# Patient Record
Sex: FEMALE | Race: WHITE | NOT HISPANIC OR LATINO | URBAN - METROPOLITAN AREA
[De-identification: names, ages, dates, MRNs, and addresses within clinical notes are randomized per-mention and may not be internally consistent; named-entity substitution may affect disease eponyms.]

---

## 2017-01-30 ENCOUNTER — APPOINTMENT (RX ONLY)
Dept: URBAN - METROPOLITAN AREA OTHER 9 | Facility: OTHER | Age: 52
Setting detail: DERMATOLOGY
End: 2017-01-30

## 2017-01-30 ENCOUNTER — APPOINTMENT (RX ONLY)
Dept: URBAN - METROPOLITAN AREA SURGERY 2 | Facility: SURGERY | Age: 52
Setting detail: DERMATOLOGY
End: 2017-01-30

## 2017-01-30 DIAGNOSIS — Z41.9 ENCOUNTER FOR PROCEDURE FOR PURPOSES OTHER THAN REMEDYING HEALTH STATE, UNSPECIFIED: ICD-10-CM

## 2017-01-30 DIAGNOSIS — L70.0 ACNE VULGARIS: ICD-10-CM

## 2017-01-30 PROCEDURE — ? BOTOX

## 2017-01-30 PROCEDURE — ? TREATMENT REGIMEN

## 2017-01-30 PROCEDURE — ? COUNSELING

## 2017-01-30 PROCEDURE — 99212 OFFICE O/P EST SF 10 MIN: CPT

## 2017-01-30 ASSESSMENT — LOCATION ZONE DERM: LOCATION ZONE: FACE

## 2017-01-30 ASSESSMENT — LOCATION SIMPLE DESCRIPTION DERM: LOCATION SIMPLE: RIGHT CHEEK

## 2017-01-30 ASSESSMENT — LOCATION DETAILED DESCRIPTION DERM: LOCATION DETAILED: RIGHT CENTRAL MALAR CHEEK

## 2017-01-30 NOTE — PROCEDURE: BOTOX
Manuel Units: 0
Administered By (Optional): Susan Palacios RN
Map Statment: See attached map for complete details
Consent: Written consent was obtained prior to the procedure. Risks, benefits, expectations and alternatives were discussed including, but not limited to, infection, bleeding, lid/brow ptosis, bruising, swelling, diplopia, temporary effects, incomplete chemical denervation and dissatisfaction with the cosmetic outcome. No guarantee or warranty was given or implied regarding longevity of results.
Show Price In Note?: yes
Additional Area 3 Location: Eyebrow
Detail Level: Simple
Periorbital Skin Units: 25
Dilution (U/0.1 Cc): 5
Postcare Instructions: Patient instructed to not lie down for 4 hours and limit physical activity for 24 hours. Patient instructed not to travel by airplane for 48 hours.\\n\\n*****
Price Per Unit In $ (Use Numbers Only, No Text Please.): 17
Glabellar Complex Units: 10
Lot #: [id number]
Additional Area 2 Location: Chin
Expiration Date (Month Year): 08/19
Use Map Statement For Sites (Optional): No
Additional Area 1 Location: Eyebrows rhytids
Bill Summary Price Listed Below, Or Bill Total Of Units X Price Per Unit?: Bill #Units x Price Per Unit

## 2017-01-30 NOTE — HPI: PIMPLES (ACNE)
How Severe Is Your Acne?: moderate
Is This A New Presentation, Or A Follow-Up?: Acne
Females Only: When Was Your Last Menstrual Period?: 01/15/2017

## 2017-02-13 ENCOUNTER — APPOINTMENT (RX ONLY)
Dept: URBAN - METROPOLITAN AREA SURGERY 2 | Facility: SURGERY | Age: 52
Setting detail: DERMATOLOGY
End: 2017-02-13

## 2017-02-13 DIAGNOSIS — Z42.8 ENCOUNTER FOR OTHER PLASTIC AND RECONSTRUCTIVE SURGERY FOLLOWING MEDICAL PROCEDURE OR HEALED INJURY: ICD-10-CM

## 2017-02-13 PROCEDURE — ? BOTOX

## 2017-02-13 NOTE — PROCEDURE: BOTOX
Consent: Written consent was obtained prior to the procedure. Risks, benefits, expectations and alternatives were discussed including, but not limited to, infection, bleeding, lid/brow ptosis, bruising, swelling, diplopia, temporary effects, incomplete chemical denervation and dissatisfaction with the cosmetic outcome. No guarantee or warranty was given or implied regarding longevity of results.
Detail Level: Simple
Additional Area 3 Location: Eyebrow
Expiration Date (Month Year): 09/19
Periorbital Skin Units: 15
Use Map Statement For Sites (Optional): No
Perioral Units: 0
Postcare Instructions: Patient instructed to not lie down for 4 hours and limit physical activity for 24 hours. Patient instructed not to travel by airplane for 48 hours.\\n\\n*****
Price Per Unit In $ (Use Numbers Only, No Text Please.): 17
Bill Summary Price Listed Below, Or Bill Total Of Units X Price Per Unit?: Bill #Units x Price Per Unit
Additional Area 1 Location: Eyebrows rhytids
Map Statment: See attached map for complete details
Lot #: [id number]
Administered By (Optional): Susan Palacios RN
Additional Area 2 Location: Chin
Dilution (U/0.1 Cc): 5
Show Price In Note?: yes

## 2017-03-08 ENCOUNTER — APPOINTMENT (RX ONLY)
Dept: URBAN - METROPOLITAN AREA SURGERY 2 | Facility: SURGERY | Age: 52
Setting detail: DERMATOLOGY
End: 2017-03-08

## 2017-03-08 DIAGNOSIS — Z42.8 ENCOUNTER FOR OTHER PLASTIC AND RECONSTRUCTIVE SURGERY FOLLOWING MEDICAL PROCEDURE OR HEALED INJURY: ICD-10-CM

## 2017-03-08 DIAGNOSIS — Z41.9 ENCOUNTER FOR PROCEDURE FOR PURPOSES OTHER THAN REMEDYING HEALTH STATE, UNSPECIFIED: ICD-10-CM

## 2017-03-08 PROCEDURE — ? BOTOX

## 2017-03-08 PROCEDURE — ? FILLERS

## 2017-03-08 NOTE — PROCEDURE: BOTOX
Consent: Written consent was obtained prior to the procedure. Risks, benefits, expectations and alternatives were discussed including, but not limited to, infection, bleeding, lid/brow ptosis, bruising, swelling, diplopia, temporary effects, incomplete chemical denervation and dissatisfaction with the cosmetic outcome. No guarantee or warranty was given or implied regarding longevity of results.
Dilution (U/0.1 Cc): 5
Postcare Instructions: Patient instructed to not lie down for 4 hours and limit physical activity for 24 hours. Patient instructed not to travel by airplane for 48 hours.\\n\\n*****
Use Map Statement For Sites (Optional): No
Additional Area 2 Units: 0
Additional Area 3 Location: Eyebrow
Bill Summary Price Listed Below, Or Bill Total Of Units X Price Per Unit?: Bill #Units x Price Per Unit
Lot #: [id number]
Additional Area 1 Location: Necklace lines
Expiration Date (Month Year): 09/19
Nasal Root Units: 10
Detail Level: Simple
Administered By (Optional): Susan Palacios RN
Price Per Unit In $ (Use Numbers Only, No Text Please.): 17
Map Statment: See attached map for complete details
Additional Area 2 Location: Chin
Show Price In Note?: yes

## 2017-03-08 NOTE — PROCEDURE: FILLERS
Cheeks Filler Volume In Cc: 0
Map Statment: See attached map for complete details
Lot #: [id number]
Additional Area 1 Location: Mu-ism scar
Administered By (Optional): Susan Palacios
Additional Area 2 Volume In Cc: 0.1
Show Price In Note?: yes
Additional Area 2 Location: Oral commissures/perimental region
Additional Area 4 Location: [facility]
Filler: Juvederm Ultra XC
Expiration Date (Month Year): 11/18
Additional Area 1 Location: Lips
Use Map Statement For Sites (Optional): No
Consent: Written consent obtained. Risks include but not limited to bruising, beading, irregular texture, ulceration, infection, allergic reaction, scar formation, incomplete augmentation, temporary nature, procedural pain.
Price $ (Numeric Only, No Text Please.): 196
Price $ (Numeric Only, No Text Please.): 375
Lot #: [id number]
Filler: Juvederm Volbella XC
Expiration Date (Month Year): 03/18
Detail Level: Simple

## 2017-08-14 ENCOUNTER — APPOINTMENT (RX ONLY)
Dept: URBAN - METROPOLITAN AREA SURGERY 2 | Facility: SURGERY | Age: 52
Setting detail: DERMATOLOGY
End: 2017-08-14

## 2017-08-14 DIAGNOSIS — Z41.9 ENCOUNTER FOR PROCEDURE FOR PURPOSES OTHER THAN REMEDYING HEALTH STATE, UNSPECIFIED: ICD-10-CM

## 2017-08-14 PROCEDURE — ? FILLERS

## 2017-08-14 PROCEDURE — ? BOTOX

## 2017-08-14 NOTE — PROCEDURE: FILLERS
Marionette Lines Filler Volume In Cc: 0
Use Map Statement For Sites (Optional): No
Topical Anesthetic Base: ointment
Administered By (Optional): Susan Palacios
Consent: Written consent obtained. Risks include but not limited to bruising, beading, irregular texture, ulceration, infection, allergic reaction, scar formation, incomplete augmentation, temporary nature, procedural pain.
Show Price In Note?: yes
Additional Area 2 Location: Glabellar
Lot #: [id number]
Additional Area 1 Location: Perioral
Nasolabial Folds Filler Volume In Cc: 0.1
Additional Area 3 Location: Oral commissures
Filler: Juvederm Ultra XC
Additional Area 2 Location: ear lobes
Additional Area 3 Location: Ear lobe
Filler: Juvederm Volbella XC
Detail Level: Simple
Additional Area 4 Location: lips
Additional Area 5 Location: facial depressions
Map Statment: See attached map for complete details

## 2017-08-14 NOTE — PROCEDURE: BOTOX
Additional Area 1 Location: necklace lines
Additional Area 2 Units: 0
Administered By (Optional): Susan Palacios RN
Bill Summary Price Listed Below, Or Bill Total Of Units X Price Per Unit?: Bill #Units x Price Per Unit
Glabellar Complex Units: 10
Dilution (U/0.1 Cc): 5
Show Price In Note?: yes
Price Per Unit In $ (Use Numbers Only, No Text Please.): 15
Periorbital Skin Units: 20
Additional Area 3 Location: DTI
Postcare Instructions: Patient instructed to not lie down for 4 hours and limit physical activity for 24 hours. Patient instructed not to travel by airplane for 48 hours.\\n\\n*****
Additional Area 2 Location: masseter muscle
Expiration Date (Month Year): 03/20
Detail Level: Simple
Consent: Written consent was obtained prior to the procedure. Risks, benefits, expectations and alternatives were discussed including, but not limited to, infection, bleeding, lid/brow ptosis, bruising, swelling, diplopia, temporary effects, incomplete chemical denervation and dissatisfaction with the cosmetic outcome. No guarantee or warranty was given or implied regarding longevity of results.
Map Statment: See attached map for complete details
Use Map Statement For Sites (Optional): No
Lot #: [id number]

## 2017-09-06 ENCOUNTER — APPOINTMENT (RX ONLY)
Dept: URBAN - METROPOLITAN AREA SURGERY 2 | Facility: SURGERY | Age: 52
Setting detail: DERMATOLOGY
End: 2017-09-06

## 2017-09-06 DIAGNOSIS — Z42.8 ENCOUNTER FOR OTHER PLASTIC AND RECONSTRUCTIVE SURGERY FOLLOWING MEDICAL PROCEDURE OR HEALED INJURY: ICD-10-CM

## 2017-09-06 PROCEDURE — ? BOTOX

## 2017-09-06 NOTE — PROCEDURE: BOTOX
Additional Area 1 Units: 0
Administered By (Optional): Susan Palacios RN
Periorbital Skin Units: 10
Postcare Instructions: Patient instructed to not lie down for 4 hours and limit physical activity for 24 hours. Patient instructed not to travel by airplane for 48 hours.\\n\\n*****
Glabellar Complex Units: 2.5
Additional Area 2 Location: Nasolabial fold
Consent: Written consent was obtained prior to the procedure. Risks, benefits, expectations and alternatives were discussed including, but not limited to, infection, bleeding, lid/brow ptosis, bruising, swelling, diplopia, temporary effects, incomplete chemical denervation and dissatisfaction with the cosmetic outcome. No guarantee or warranty was given or implied regarding longevity of results.
Additional Area 3 Location: DTI
Use Map Statement For Sites (Optional): No
Show Price In Note?: yes
Price Per Unit In $ (Use Numbers Only, No Text Please.): 12
Dilution (U/0.1 Cc): 5
Additional Area 1 Location: necklace lines
Lot #: [id number]
Map Statment: See attached map for complete details
Detail Level: Simple
Expiration Date (Month Year): 03/20
Bill Summary Price Listed Below, Or Bill Total Of Units X Price Per Unit?: Bill #Units x Price Per Unit

## 2017-11-28 ENCOUNTER — APPOINTMENT (RX ONLY)
Dept: URBAN - METROPOLITAN AREA CLINIC 12 | Facility: CLINIC | Age: 52
Setting detail: DERMATOLOGY
End: 2017-11-28

## 2017-11-28 DIAGNOSIS — Z41.9 ENCOUNTER FOR PROCEDURE FOR PURPOSES OTHER THAN REMEDYING HEALTH STATE, UNSPECIFIED: ICD-10-CM

## 2017-11-28 PROCEDURE — ? FILLERS

## 2017-11-28 PROCEDURE — ? BOTOX

## 2017-11-28 NOTE — PROCEDURE: BOTOX
Forehead Units: 5
Show Price In Note?: no
Additional Area 1 Units: 0
Detail Level: Detailed
Additional Area 2 Location: muscle spasm
Periorbital Skin Units: 20
Dilution (U/0.1 Cc): 4
Consent: Written consent was obtained prior to the procedure. Risks, benefits, expectations and alternatives were discussed including, but not limited to, infection, bleeding, lid/brow ptosis, bruising, swelling, diplopia, temporary effects, incomplete chemical denervation and dissatisfaction with the cosmetic outcome. No guarantee or warranty was given or implied regarding longevity of results.
Additional Area 1 Location: chin
Bill Summary Price Listed Below, Or Bill Total Of Units X Price Per Unit?: Bill #Units x Price Per Unit
Use Map Statement For Sites (Optional): Yes
Expiration Date (Month Year): 05/20
Lot #: [id number]
Postcare Instructions: Patient instructed to not lie down for 4 hours and limit physical activity for 24 hours. Patient instructed not to travel by airplane for 48 hours.
Map Statment: See attached map for complete details
Administered By (Optional): Susan Palacios
Glabellar Complex Units: 10
Price Per Unit In $ (Use Numbers Only, No Text Please.): 17

## 2018-01-01 NOTE — PROCEDURE: TREATMENT REGIMEN
Samples Given: Solodyn  QD 5 weeks with food and water ,plexion lotion at night
Detail Level: Simple
Birth

## 2018-03-26 ENCOUNTER — APPOINTMENT (RX ONLY)
Dept: URBAN - METROPOLITAN AREA CLINIC 12 | Facility: CLINIC | Age: 53
Setting detail: DERMATOLOGY
End: 2018-03-26

## 2018-03-26 DIAGNOSIS — Z41.1 ENCOUNTER FOR COSMETIC SURGERY: ICD-10-CM

## 2018-03-26 PROCEDURE — ? CONSULTATION - LIPOSUCTION

## 2018-03-26 PROCEDURE — ? CONSULTATION - BLEPHAROPLASTY

## 2018-03-26 PROCEDURE — ? COSMETIC CONSULTATION: THERMI

## 2018-03-26 PROCEDURE — ? PATIENT SPECIFIC COUNSELING

## 2018-03-26 ASSESSMENT — LOCATION SIMPLE DESCRIPTION DERM
LOCATION SIMPLE: RIGHT INFERIOR EYELID
LOCATION SIMPLE: LEFT INFERIOR EYELID
LOCATION SIMPLE: RIGHT SUPERIOR EYELID
LOCATION SIMPLE: LEFT SUPERIOR EYELID

## 2018-03-26 ASSESSMENT — LOCATION ZONE DERM: LOCATION ZONE: EYELID

## 2018-03-26 ASSESSMENT — LOCATION DETAILED DESCRIPTION DERM
LOCATION DETAILED: LEFT LATERAL INFERIOR PRESEPTAL REGION
LOCATION DETAILED: RIGHT MEDIAL INFERIOR EYELID
LOCATION DETAILED: RIGHT MEDIAL SUPERIOR EYELID
LOCATION DETAILED: LEFT LATERAL SUPERIOR EYELID

## 2018-03-26 NOTE — PROCEDURE: PATIENT SPECIFIC COUNSELING
Other (Free Text): Patient has had a face lift, as well as an upper blepharoplasty. She presents today for a consultation regarding the skin laxity present in her upper eyelids. Patient wears contacts for distance. Dr. Mojica informed pt she is great candidate for an upper blepharoplasty. Explained to pt that the procedure would be done in the office under local.\\nCathy gave patient quote for an upper blepharoplasty.
Detail Level: Zone
Other (Free Text): Patient has concern about the skin laxity present below her neck. Dr. Mojica informed pt she is a great candidate for liposuction with Thermi. Patient stated she has plans to lose 10 pounds. Dr. Mojica advised patient to lose the planned weight before moving forward with any procedure.

## 2018-03-26 NOTE — PROCEDURE: CONSULTATION - BLEPHAROPLASTY
Detail Level: Detailed
Consultation Charge $ (Use Numbers Only, No Text Please.): 100.00
Send Procedure Quote As Charge: No

## 2018-05-15 ENCOUNTER — APPOINTMENT (RX ONLY)
Dept: URBAN - METROPOLITAN AREA CLINIC 12 | Facility: CLINIC | Age: 53
Setting detail: DERMATOLOGY
End: 2018-05-15

## 2018-05-16 ENCOUNTER — APPOINTMENT (RX ONLY)
Dept: URBAN - METROPOLITAN AREA CLINIC 12 | Facility: CLINIC | Age: 53
Setting detail: DERMATOLOGY
End: 2018-05-16

## 2018-05-16 DIAGNOSIS — Z41.1 ENCOUNTER FOR COSMETIC SURGERY: ICD-10-CM

## 2018-05-16 PROCEDURE — 99024 POSTOP FOLLOW-UP VISIT: CPT

## 2018-05-16 PROCEDURE — ? PRE-OP WORKLIST

## 2018-05-16 PROCEDURE — ? PATIENT SPECIFIC COUNSELING

## 2018-05-16 PROCEDURE — ? NO CHARGE PRE-OP VISIT

## 2018-05-16 ASSESSMENT — LOCATION ZONE DERM: LOCATION ZONE: EYELID

## 2018-05-16 ASSESSMENT — LOCATION SIMPLE DESCRIPTION DERM
LOCATION SIMPLE: LEFT SUPERIOR EYELID
LOCATION SIMPLE: RIGHT SUPERIOR EYELID

## 2018-05-16 ASSESSMENT — LOCATION DETAILED DESCRIPTION DERM
LOCATION DETAILED: LEFT LATERAL SUPERIOR EYELID
LOCATION DETAILED: RIGHT MEDIAL SUPERIOR EYELID

## 2018-05-16 NOTE — PROCEDURE: PATIENT SPECIFIC COUNSELING
Detail Level: Detailed
Other (Free Text): Briefly discussed with patient what to expect the day of surgery.

## 2018-05-16 NOTE — HPI: PREOPERATIVE APPOINTMENT
Which Side(S)?: both eyes
Has The Patient Completed Informed Consent?: is scheduled to complete informed consent documentation during this visit
Date Of Procedure?: 5/22/2018
Facility: [facility]
Surgeon: Dr. Grayson Mojica

## 2018-05-16 NOTE — PROCEDURE: NO CHARGE PRE-OP VISIT
Detail Level: Detailed
For Tracking Purposes Only. Note 20698 Is Retired Code. Use 68548 Or 74428?: [id number]

## 2018-05-16 NOTE — PROCEDURE: PRE-OP WORKLIST
Detail Level: Simple
Date Of Surgery: 5/22/18
Admission Status: outpatient
Surgeon: Dr. Mojica
Surgery Scheduled: Upper lid bleph

## 2018-05-22 ENCOUNTER — APPOINTMENT (RX ONLY)
Dept: URBAN - METROPOLITAN AREA CLINIC 12 | Facility: CLINIC | Age: 53
Setting detail: DERMATOLOGY
End: 2018-05-22

## 2018-05-22 DIAGNOSIS — Z41.1 ENCOUNTER FOR COSMETIC SURGERY: ICD-10-CM

## 2018-05-22 PROCEDURE — ? COSMETIC BLEPHAROPLASTY

## 2018-05-22 ASSESSMENT — LOCATION SIMPLE DESCRIPTION DERM
LOCATION SIMPLE: LEFT SUPERIOR EYELID
LOCATION SIMPLE: RIGHT SUPERIOR EYELID
LOCATION SIMPLE: LEFT SUPERIOR EYELID
LOCATION SIMPLE: RIGHT SUPERIOR EYELID

## 2018-05-22 ASSESSMENT — LOCATION DETAILED DESCRIPTION DERM
LOCATION DETAILED: RIGHT MEDIAL SUPERIOR EYELID
LOCATION DETAILED: LEFT LATERAL SUPERIOR EYELID
LOCATION DETAILED: RIGHT MEDIAL SUPERIOR EYELID
LOCATION DETAILED: LEFT LATERAL SUPERIOR EYELID

## 2018-05-22 ASSESSMENT — LOCATION ZONE DERM
LOCATION ZONE: EYELID
LOCATION ZONE: EYELID

## 2018-05-22 NOTE — PROCEDURE: COSMETIC BLEPHAROPLASTY
Estimated Blood Loss (Cc): minimal
Detail Level: Generalized
Surgeon: Dr. Mojica
Assistant: Melissa Tompkins
Local Anesthesia Volume In Cc: 15
Hemostasis: electrocautery
Price $ (Use Numbers Only, No Text Please.): 2000
Preparation: A time-out was taken prior to the procedure to identify the patient and surgical sites. An intravenous line was established and cardiac monitors were placed.
Skin Marking: The area of eyelid skin to be excised from the upper eyelid was marked out with a marking pen.
Intro: The patient was prepared and draped in the usual sterile fashion.
Skin Marking: Incisions were marked out with a marking pen approximately 1 mm inferior to the lash line from the level of the inferior punctum and extending laterally from the lateral canthus in a slightly downward direction for approximately 1 cm.
Consent: The risks, benefits, expectations and alternatives of blepharoplasty were discussed in detail with the patient, including, but not limited to, the risks of postoperative infection, bleeding, delayed healing, injury to the globe,  scarring, pain, asymmetry, loss of vision, lagophthalmos, ectropion, entropion, corneal abrasion, possible need for additional procedures, and dissatisfaction with cosmetic outcome. The patient verbalized understanding and nformed consent was obtained.
Skin Closure: simple interrupted
Skin Closure: horizontal mattress
Conjunctival Closure Sutures: 6-0 fast absorbing plain gut
Skin Closure Sutures: 6-0 Prolene

## 2018-05-29 ENCOUNTER — APPOINTMENT (RX ONLY)
Dept: URBAN - METROPOLITAN AREA CLINIC 12 | Facility: CLINIC | Age: 53
Setting detail: DERMATOLOGY
End: 2018-05-29

## 2018-05-29 DIAGNOSIS — Z41.9 ENCOUNTER FOR PROCEDURE FOR PURPOSES OTHER THAN REMEDYING HEALTH STATE, UNSPECIFIED: ICD-10-CM

## 2018-05-29 DIAGNOSIS — Z48.89 ENCOUNTER FOR OTHER SPECIFIED SURGICAL AFTERCARE: ICD-10-CM

## 2018-05-29 PROCEDURE — ? COUNSELING - POST-OP CHECK

## 2018-05-29 PROCEDURE — ? POST-OP CHECK

## 2018-05-29 PROCEDURE — 99024 POSTOP FOLLOW-UP VISIT: CPT

## 2018-05-29 PROCEDURE — ? BOTOX

## 2018-05-29 PROCEDURE — ? PATIENT SPECIFIC COUNSELING

## 2018-05-29 ASSESSMENT — LOCATION SIMPLE DESCRIPTION DERM
LOCATION SIMPLE: LEFT SUPERIOR EYELID
LOCATION SIMPLE: RIGHT SUPERIOR EYELID
LOCATION SIMPLE: RIGHT SUPERIOR EYELID
LOCATION SIMPLE: LEFT SUPERIOR EYELID

## 2018-05-29 ASSESSMENT — LOCATION DETAILED DESCRIPTION DERM
LOCATION DETAILED: RIGHT LATERAL SUPERIOR EYELID
LOCATION DETAILED: LEFT LATERAL SUPERIOR EYELID
LOCATION DETAILED: RIGHT LATERAL SUPERIOR EYELID
LOCATION DETAILED: LEFT LATERAL SUPERIOR EYELID

## 2018-05-29 ASSESSMENT — LOCATION ZONE DERM
LOCATION ZONE: EYELID
LOCATION ZONE: EYELID

## 2018-05-29 NOTE — PROCEDURE: PATIENT SPECIFIC COUNSELING
Other (Free Text): Patient presents for post op check blepharoplasty. Patient states that she’s doing well,  she states she has no concerns. Dr. Mojica examined her eyes, and explained that she looks great, no signs of infection. Patient was educated on sun protection. Patient was released to resume her normal activity, however Dr. Mojica advised to listen to her body. Patient to return
Detail Level: Zone

## 2018-05-29 NOTE — PROCEDURE: POST-OP CHECK
Wound Evaluated By: Dr. Mojica
Add Postop Global No-Charge Code (24251)?: yes
Detail Level: Detailed

## 2018-05-29 NOTE — PROCEDURE: BOTOX
Forehead Units: 5
Use Map Statement For Sites (Optional): Yes
Postcare Instructions: Patient instructed to not lie down for 4 hours and limit physical activity for 24 hours. Patient instructed not to travel by airplane for 48 hours.
Additional Area 1 Units: 0
Bill Summary Price Listed Below, Or Bill Total Of Units X Price Per Unit?: Bill #Units x Price Per Unit
Additional Area 2 Location: nasalis depressor
Additional Area 1 Location: chin
Additional Area 3 Location: tail end of brows
Consent: Written consent was obtained prior to the procedure. Risks, benefits, expectations and alternatives were discussed including, but not limited to, infection, bleeding, lid/brow ptosis, bruising, swelling, diplopia, temporary effects, incomplete chemical denervation and dissatisfaction with the cosmetic outcome. No guarantee or warranty was given or implied regarding longevity of results.
Expiration Date (Month Year): 12/2020
Price Per Unit In $ (Use Numbers Only, No Text Please.): 17
Administered By (Optional): Susan Palacios
Periorbital Skin Units: 20
Glabellar Complex Units: 10
Detail Level: Simple
Lot #: [id number]
Map Statment: See attached map for complete details

## 2018-06-12 ENCOUNTER — APPOINTMENT (RX ONLY)
Dept: URBAN - METROPOLITAN AREA CLINIC 12 | Facility: CLINIC | Age: 53
Setting detail: DERMATOLOGY
End: 2018-06-12

## 2018-06-12 DIAGNOSIS — Z48.89 ENCOUNTER FOR OTHER SPECIFIED SURGICAL AFTERCARE: ICD-10-CM

## 2018-06-12 DIAGNOSIS — Z42.8 ENCOUNTER FOR OTHER PLASTIC AND RECONSTRUCTIVE SURGERY FOLLOWING MEDICAL PROCEDURE OR HEALED INJURY: ICD-10-CM

## 2018-06-12 PROCEDURE — ? COUNSELING - POST-OP CHECK

## 2018-06-12 PROCEDURE — ? PATIENT SPECIFIC COUNSELING

## 2018-06-12 PROCEDURE — ? BOTOX

## 2018-06-12 PROCEDURE — ? POST-OP CHECK

## 2018-06-12 PROCEDURE — 99024 POSTOP FOLLOW-UP VISIT: CPT

## 2018-06-12 ASSESSMENT — LOCATION ZONE DERM
LOCATION ZONE: EYELID
LOCATION ZONE: EYELID

## 2018-06-12 ASSESSMENT — LOCATION SIMPLE DESCRIPTION DERM
LOCATION SIMPLE: RIGHT SUPERIOR EYELID
LOCATION SIMPLE: RIGHT SUPERIOR EYELID
LOCATION SIMPLE: LEFT SUPERIOR EYELID
LOCATION SIMPLE: LEFT SUPERIOR EYELID

## 2018-06-12 ASSESSMENT — LOCATION DETAILED DESCRIPTION DERM
LOCATION DETAILED: LEFT LATERAL SUPERIOR EYELID
LOCATION DETAILED: RIGHT LATERAL SUPERIOR EYELID
LOCATION DETAILED: RIGHT LATERAL SUPERIOR EYELID
LOCATION DETAILED: LEFT LATERAL SUPERIOR EYELID

## 2018-06-12 NOTE — PROCEDURE: PATIENT SPECIFIC COUNSELING
Detail Level: Zone
Other (Free Text): Patient presents for post op check blepharoplasty. Patient states that she’s doing well,  she states she has no concerns. Dr. Mojica examined her eyes, and explained that she looks great, no signs of infection. Patient was educated on sun protection. Patient was released to resume her normal activity, however Dr. Mojica advised to listen to her body. Patient to return

## 2018-07-24 ENCOUNTER — APPOINTMENT (RX ONLY)
Dept: URBAN - METROPOLITAN AREA CLINIC 12 | Facility: CLINIC | Age: 53
Setting detail: DERMATOLOGY
End: 2018-07-24

## 2018-07-24 DIAGNOSIS — Z48.89 ENCOUNTER FOR OTHER SPECIFIED SURGICAL AFTERCARE: ICD-10-CM

## 2018-07-24 PROCEDURE — ? PATIENT SPECIFIC COUNSELING

## 2018-07-24 PROCEDURE — ? POST-OP CHECK

## 2018-07-24 PROCEDURE — ? COUNSELING - POST-OP CHECK

## 2018-07-24 PROCEDURE — 99024 POSTOP FOLLOW-UP VISIT: CPT

## 2018-07-24 ASSESSMENT — LOCATION ZONE DERM
LOCATION ZONE: EYELID
LOCATION ZONE: EYELID

## 2018-07-24 NOTE — PROCEDURE: PATIENT SPECIFIC COUNSELING
Other (Free Text): Patient presents for post op check blepharoplasty. Patient states that she’s doing well,  she states she has no concerns. Dr. Mojica examined her eyes, and explained that she looks great, scars are fading great and will continue to fade over time, no signs of infection. Patient was educated on sun protection. Patient was released to resume her normal activity, however Dr. Mojica advised to listen to her body. Patient to return. Patient still needs to get medical records with serial numbers regarding breast implants for possible surgery. Not sure if implants still under warranty.
Detail Level: Zone

## 2018-07-24 NOTE — PROCEDURE: POST-OP CHECK
Wound Evaluated By: Dr. Mojica
Detail Level: Detailed
Add Postop Global No-Charge Code (64542)?: yes

## 2018-10-12 ENCOUNTER — APPOINTMENT (RX ONLY)
Dept: URBAN - METROPOLITAN AREA CLINIC 12 | Facility: CLINIC | Age: 53
Setting detail: DERMATOLOGY
End: 2018-10-12

## 2018-10-12 DIAGNOSIS — Z41.9 ENCOUNTER FOR PROCEDURE FOR PURPOSES OTHER THAN REMEDYING HEALTH STATE, UNSPECIFIED: ICD-10-CM

## 2018-10-12 PROCEDURE — ? FILLERS

## 2018-10-12 PROCEDURE — ? BOTOX

## 2018-10-12 NOTE — PROCEDURE: BOTOX
Additional Area 2 Units: 0
Consent: Written consent was obtained prior to the procedure. Risks, benefits, expectations and alternatives were discussed including, but not limited to, infection, bleeding, lid/brow ptosis, bruising, swelling, diplopia, temporary effects, incomplete chemical denervation and dissatisfaction with the cosmetic outcome. No guarantee or warranty was given or implied regarding longevity of results.
Lot #: [id number]
Administered By (Optional): Susan Palacios
Additional Area 3 Location: nasalis depressor
Additional Area 2 Location: tail end of brow
Additional Area 1 Location: chin
Map Statment: See attached map for complete details
Forehead Units: 5
Expiration Date (Month Year): 05/2021
Price Per Unit In $ (Use Numbers Only, No Text Please.): 17
Use Map Statement For Sites (Optional): Yes
Detail Level: Simple
Postcare Instructions: Patient instructed to not lie down for 4 hours and limit physical activity for 24 hours. Patient instructed not to travel by airplane for 48 hours.
Glabellar Complex Units: 10
Periorbital Skin Units: 20
Bill Summary Price Listed Below, Or Bill Total Of Units X Price Per Unit?: Bill #Units x Price Per Unit

## 2018-10-12 NOTE — PROCEDURE: FILLERS
Additional Area 3 Volume In Cc: 0
Additional Area 5 Location: oral commissures
Administered By (Optional): Susan Palacios RN
Topical Anesthetic #2: tetracaine
Topical Anesthetic #1: lidocaine
Topical % #2: 7
Consent: Written consent obtained. Risks include but not limited to bruising, beading, irregular texture, ulceration, infection, allergic reaction, scar formation, incomplete augmentation, temporary nature, procedural pain.
Lower Lips Filler Volume In Cc: 0.1
Lot #: [id number]
Additional Area 2 Location: glabellar line
Map Statment: See attached map for complete details
Use Map Statement For Sites (Optional): Yes
Additional Area 4 Location: cheek depressions
Additional Area 2 Location: forehead depressions
Expiration Date (Month Year): 08/2019
Topical Anesthetic Base: ointment
Topical % #1: 23
Additional Area 1 Location: horizontal perioral line
Detail Level: Simple
Price $ (Numeric Only, No Text Please.): 395
Additional Area 4 Location: necklace lines
Additional Area 3 Location: glabellar scar and perioral line
Filler: Juvederm Ultra XC
Additional Area 1 Location: glabellar/nasal root lines
Additional Area 3 Location: perioral lines

## 2018-12-05 ENCOUNTER — APPOINTMENT (RX ONLY)
Dept: URBAN - METROPOLITAN AREA CLINIC 12 | Facility: CLINIC | Age: 53
Setting detail: DERMATOLOGY
End: 2018-12-05

## 2018-12-05 DIAGNOSIS — Z41.9 ENCOUNTER FOR PROCEDURE FOR PURPOSES OTHER THAN REMEDYING HEALTH STATE, UNSPECIFIED: ICD-10-CM

## 2018-12-05 PROCEDURE — ? BOTOX

## 2018-12-05 NOTE — PROCEDURE: BOTOX
Detail Level: Simple
Price Per Unit In $ (Use Numbers Only, No Text Please.): 14
Forehead Units: 0
Additional Area 1 Location: [facility]
Administered By (Optional): Susan Palacios
Postcare Instructions: Patient instructed to not lie down for 4 hours and limit physical activity for 24 hours. Patient instructed not to travel by airplane for 48 hours.
Show Price In Note?: yes
Map Statment: See attached map for complete details
Dilution (U/0.1 Cc): 5
Consent: Written consent was obtained prior to the procedure. Risks, benefits, expectations and alternatives were discussed including, but not limited to, infection, bleeding, lid/brow ptosis, bruising, swelling, diplopia, temporary effects, incomplete chemical denervation and dissatisfaction with the cosmetic outcome. No guarantee or warranty was given or implied regarding longevity of results.
Additional Area 2 Location: tail end of brow
Glabellar Complex Units: 12.5
Additional Area 3 Location: nasalis depressor
Periorbital Skin Units: 10
Lot #: [id number]
Bill Summary Price Listed Below, Or Bill Total Of Units X Price Per Unit?: Bill #Units x Price Per Unit
Expiration Date (Month Year): 05/21

## 2019-01-07 NOTE — PROCEDURE: BOTOX
Consent: Written consent was obtained prior to the procedure. Risks, benefits, expectations and alternatives were discussed including, but not limited to, infection, bleeding, lid/brow ptosis, bruising, swelling, diplopia, temporary effects, incomplete chemical denervation and dissatisfaction with the cosmetic outcome. No guarantee or warranty was given or implied regarding longevity of results.
SW reached out to [employer] ([phone]) to schedule transportation for Pi's upcoming appointments. Details noted below. Patient's mother, Ma was called with a [employer]  ID#[id number] and provided these details. Details noted were left via voicemail with SW call back number noted should they have any questions. SW will ask  to make a reminder phone call the day before each appointment.     Thursday, January 17th, 2019 - 10:00 am  [employer] ([phone])  Pick-Up: 9:15 am for 10 am appointment time.   Return Ride: Available at will call given length of appointment. Call [employer] at [phone] on completion of appointment to .   Confirmation # [id number]    Thursday, February 7th, 2019 - 8:30 am  [employer] ([phone])  Pick-Up: 7:45 am for 8:30 am appointment time.   Return Ride: Available at will call given length & number of appointments. Call [employer] at [phone] on completion of appointment to .   Confirmation # [id number]    Social work will continue to assess needs and provide ongoing psychosocial support and access to resources.      BALODMERO Ash, LICSW, OSW-C  Clinical    Pediatric Hematology Oncology   [facility]   Monday-Thursday   Phone: [phone]  Pager: [phone]    NO LETTER    
Map Statment: See attached map for complete details
Perioral Units: 0
Detail Level: Simple
Expiration Date (Month Year): 12/2020
Forehead Units: 5
Bill Summary Price Listed Below, Or Bill Total Of Units X Price Per Unit?: Bill #Units x Price Per Unit
Use Map Statement For Sites (Optional): Yes
Postcare Instructions: Patient instructed to not lie down for 4 hours and limit physical activity for 24 hours. Patient instructed not to travel by airplane for 48 hours.
Price Per Unit In $ (Use Numbers Only, No Text Please.): 14
Lot #: [id number]
Administered By (Optional): Susan Palacios
Additional Area 2 Location: nasalis depressor
Additional Area 1 Location: chin
Periorbital Skin Units: 15
Additional Area 3 Location: orbicularis
Glabellar Complex Units: 2.5

## 2019-03-18 ENCOUNTER — APPOINTMENT (RX ONLY)
Dept: URBAN - METROPOLITAN AREA CLINIC 12 | Facility: CLINIC | Age: 54
Setting detail: DERMATOLOGY
End: 2019-03-18

## 2019-03-18 DIAGNOSIS — Z41.9 ENCOUNTER FOR PROCEDURE FOR PURPOSES OTHER THAN REMEDYING HEALTH STATE, UNSPECIFIED: ICD-10-CM

## 2019-03-18 PROCEDURE — ? BOTOX

## 2019-03-18 NOTE — PROCEDURE: BOTOX
Postcare Instructions: Patient instructed to not lie down for 4 hours and limit physical activity for 24 hours. Patient instructed not to travel by airplane for 48 hours.
Administered By (Optional): Susan Palacios
Glabellar Complex Units: 7.5
Consent: Written consent was obtained prior to the procedure. Risks, benefits, expectations and alternatives were discussed including, but not limited to, infection, bleeding, lid/brow ptosis, bruising, swelling, diplopia, temporary effects, incomplete chemical denervation and dissatisfaction with the cosmetic outcome. No guarantee or warranty was given or implied regarding longevity of results.
Price Per Unit In $ (Use Numbers Only, No Text Please.): 17
Additional Area 3 Units: 0
Additional Area 1 Location: tail of brow
Expiration Date (Month Year): 08/2021
Dilution (U/0.1 Cc): 5
Additional Area 2 Location: Chin
Map Statment: See attached map for complete details
Use Map Statement For Sites (Optional): Yes
Additional Area 3 Location: necklace lines
Lot #: [id number]
Periorbital Skin Units: 20
Detail Level: Simple
Bill Summary Price Listed Below, Or Bill Total Of Units X Price Per Unit?: Bill #Units x Price Per Unit

## 2019-04-04 ENCOUNTER — APPOINTMENT (RX ONLY)
Dept: URBAN - METROPOLITAN AREA CLINIC 12 | Facility: CLINIC | Age: 54
Setting detail: DERMATOLOGY
End: 2019-04-04

## 2019-04-04 DIAGNOSIS — Z42.8 ENCOUNTER FOR OTHER PLASTIC AND RECONSTRUCTIVE SURGERY FOLLOWING MEDICAL PROCEDURE OR HEALED INJURY: ICD-10-CM

## 2019-04-04 PROCEDURE — ? BOTOX

## 2019-04-04 NOTE — PROCEDURE: BOTOX
Additional Area 2 Units: 0
Detail Level: Simple
Bill Summary Price Listed Below, Or Bill Total Of Units X Price Per Unit?: Bill #Units x Price Per Unit
Dilution (U/0.1 Cc): 5
Map Statment: See attached map for complete details
Use Map Statement For Sites (Optional): Yes
Forehead Units: 2.5
Additional Area 1 Location: tail of brow
Additional Area 3 Location: nasalis depressor
Consent: Written consent was obtained prior to the procedure. Risks, benefits, expectations and alternatives were discussed including, but not limited to, infection, bleeding, lid/brow ptosis, bruising, swelling, diplopia, temporary effects, incomplete chemical denervation and dissatisfaction with the cosmetic outcome. No guarantee or warranty was given or implied regarding longevity of results.
Postcare Instructions: Patient instructed to not lie down for 4 hours and limit physical activity for 24 hours. Patient instructed not to travel by airplane for 48 hours.
Lot #: [id number]
Administered By (Optional): Susan Palacios
Additional Area 2 Location: Chin
Expiration Date (Month Year): 08/2021

## 2019-04-20 ENCOUNTER — APPOINTMENT (RX ONLY)
Dept: URBAN - METROPOLITAN AREA CLINIC 12 | Facility: CLINIC | Age: 54
Setting detail: DERMATOLOGY
End: 2019-04-20

## 2019-04-20 DIAGNOSIS — Z42.8 ENCOUNTER FOR OTHER PLASTIC AND RECONSTRUCTIVE SURGERY FOLLOWING MEDICAL PROCEDURE OR HEALED INJURY: ICD-10-CM

## 2019-04-20 PROCEDURE — ? BOTOX

## 2019-04-20 NOTE — PROCEDURE: BOTOX
Additional Area 3 Location: necklace lines
Periorbital Skin Units: 0
Expiration Date (Month Year): 08/2021
Map Statment: See attached map for complete details
Detail Level: Simple
Show Price In Note?: yes
Lot #: [id number]
Administered By (Optional): Susan Palacios
Price Per Unit In $ (Use Numbers Only, No Text Please.): 17
Forehead Units: 2.5
Bill Summary Price Listed Below, Or Bill Total Of Units X Price Per Unit?: Bill #Units x Price Per Unit
Additional Area 1 Location: tail of brow
Postcare Instructions: Patient instructed to not lie down for 4 hours and limit physical activity for 24 hours. Patient instructed not to travel by airplane for 48 hours.
Consent: Written consent was obtained prior to the procedure. Risks, benefits, expectations and alternatives were discussed including, but not limited to, infection, bleeding, lid/brow ptosis, bruising, swelling, diplopia, temporary effects, incomplete chemical denervation and dissatisfaction with the cosmetic outcome. No guarantee or warranty was given or implied regarding longevity of results.
Additional Area 2 Location: Chin
Dilution (U/0.1 Cc): 5

## 2019-08-02 ENCOUNTER — APPOINTMENT (RX ONLY)
Dept: URBAN - METROPOLITAN AREA CLINIC 12 | Facility: CLINIC | Age: 54
Setting detail: DERMATOLOGY
End: 2019-08-02

## 2019-08-02 DIAGNOSIS — Z41.9 ENCOUNTER FOR PROCEDURE FOR PURPOSES OTHER THAN REMEDYING HEALTH STATE, UNSPECIFIED: ICD-10-CM

## 2019-08-02 PROCEDURE — ? BOTOX

## 2019-08-02 NOTE — PROCEDURE: BOTOX
Price Per Unit In $ (Use Numbers Only, No Text Please.): 17
Map Statment: See attached map for complete details
Manuel Units: 0
Postcare Instructions: Patient instructed to not lie down for 4 hours and limit physical activity for 24 hours. Patient instructed not to travel by airplane for 48 hours.
Detail Level: Simple
Additional Area 1 Location: in the brow
Dilution (U/0.1 Cc): 5
Consent: Written consent was obtained prior to the procedure. Risks, benefits, expectations and alternatives were discussed including, but not limited to, infection, bleeding, lid/brow ptosis, bruising, swelling, diplopia, temporary effects, incomplete chemical denervation and dissatisfaction with the cosmetic outcome. No guarantee or warranty was given or implied regarding longevity of results.
Additional Area 3 Location: nasalis depressor
Administered By (Optional): Susan Palacios
Additional Area 2 Location: Chin
Periorbital Skin Units: 15
Expiration Date (Month Year): 01/2022
Show Price In Note?: yes
Lot #: [id number]
Bill Summary Price Listed Below, Or Bill Total Of Units X Price Per Unit?: Bill #Units x Price Per Unit
Glabellar Complex Units: 10

## 2019-11-22 ENCOUNTER — APPOINTMENT (RX ONLY)
Dept: URBAN - METROPOLITAN AREA CLINIC 12 | Facility: CLINIC | Age: 54
Setting detail: DERMATOLOGY
End: 2019-11-22

## 2019-11-22 DIAGNOSIS — Z41.9 ENCOUNTER FOR PROCEDURE FOR PURPOSES OTHER THAN REMEDYING HEALTH STATE, UNSPECIFIED: ICD-10-CM

## 2019-11-22 PROCEDURE — ? BOTOX

## 2019-11-22 NOTE — PROCEDURE: BOTOX
Lot #: [id number]
Administered By (Optional): Susan Palacios
Platsyma Units: 0
Show Price In Note?: yes
Periorbital Skin Units: 15
Detail Level: Simple
Additional Area 3 Location: cheek striations
Postcare Instructions: Patient instructed to not lie down for 4 hours and limit physical activity for 24 hours. Patient instructed not to travel by airplane for 48 hours.
Forehead Units: 5
Additional Area 2 Location: Chin
Additional Area 1 Location: sneer muscles
Expiration Date (Month Year): 05/2022
Glabellar Complex Units: 10
Consent: Written consent was obtained prior to the procedure. Risks, benefits, expectations and alternatives were discussed including, but not limited to, infection, bleeding, lid/brow ptosis, bruising, swelling, diplopia, temporary effects, incomplete chemical denervation and dissatisfaction with the cosmetic outcome. No guarantee or warranty was given or implied regarding longevity of results.
Bill Summary Price Listed Below, Or Bill Total Of Units X Price Per Unit?: Bill #Units x Price Per Unit
Map Statment: See attached map for complete details
Price Per Unit In $ (Use Numbers Only, No Text Please.): 17

## 2019-12-03 ENCOUNTER — APPOINTMENT (RX ONLY)
Dept: URBAN - METROPOLITAN AREA CLINIC 12 | Facility: CLINIC | Age: 54
Setting detail: DERMATOLOGY
End: 2019-12-03

## 2019-12-03 DIAGNOSIS — Z41.1 ENCOUNTER FOR COSMETIC SURGERY: ICD-10-CM

## 2019-12-03 PROCEDURE — ? CONSULTATION - BREAST (COSMETIC)

## 2019-12-03 PROCEDURE — ? PHOTOS OBTAINED

## 2019-12-03 PROCEDURE — ? CONSULTATION - AGING FACE

## 2019-12-03 PROCEDURE — ? PATIENT SPECIFIC COUNSELING

## 2019-12-03 ASSESSMENT — LOCATION ZONE DERM: LOCATION ZONE: TRUNK

## 2019-12-03 ASSESSMENT — LOCATION SIMPLE DESCRIPTION DERM
LOCATION SIMPLE: RIGHT BREAST
LOCATION SIMPLE: LEFT BREAST

## 2019-12-03 ASSESSMENT — LOCATION DETAILED DESCRIPTION DERM
LOCATION DETAILED: RIGHT MEDIAL BREAST 4-5:00 REGION
LOCATION DETAILED: LEFT MEDIAL BREAST 6-7:00 REGION

## 2019-12-03 NOTE — PROCEDURE: CONSULTATION - AGING FACE
Detail Level: Detailed
Send Procedure Quote As Charge: No
Consultation Charge $ (Use Numbers Only, No Text Please.): 120

## 2019-12-03 NOTE — PROCEDURE: PHOTOS OBTAINED
Follow-Up For Additional Photos?: no
Follow-Up Interval: day
Detail Level: Simple
Photographed Locations: Photos were obtained of the surgical site(s).
Follow-Up Increment: 0

## 2019-12-03 NOTE — PROCEDURE: PATIENT SPECIFIC COUNSELING
Other (Free Text): Patient presents for cosmetic consult. Patient has had her breast implants since 2009 and had a capsule on the left side which makes it appear larger. Dr. Mojica examined and noted a grade II capsule on the right and a grade III on the left. Dr. Mojica agrees that they need to be exchanged. Patient stated that she would like to go smaller as well. Dr. 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 The way to correct this is to repeat the ADM repeat which also gives them a better chance of not having a capsular contracture. Dr. Mojica asked if he could share her case at his interesting cases meeting. Patient stated that he could. \\n\\nSkin: for her skin, dr. Mojica recommends doing a series of microneedling with Neli. The other option is infinite, but dr. Mojica explained that he does not feel that her skin texture is bad enough and this procedure is painful. For her eyes, dr. Mojica recommends a croton oil peel.\\Chica provided quote for everything discussed today
Detail Level: Simple

## 2020-02-12 ENCOUNTER — APPOINTMENT (RX ONLY)
Dept: URBAN - METROPOLITAN AREA CLINIC 12 | Facility: CLINIC | Age: 55
Setting detail: DERMATOLOGY
End: 2020-02-12

## 2020-02-12 DIAGNOSIS — Z41.9 ENCOUNTER FOR PROCEDURE FOR PURPOSES OTHER THAN REMEDYING HEALTH STATE, UNSPECIFIED: ICD-10-CM

## 2020-02-12 PROCEDURE — ? BOTOX

## 2020-02-12 NOTE — PROCEDURE: BOTOX
Perioral Units: 0
Lot #: [id number]
Consent: Written consent was obtained prior to the procedure. Risks, benefits, expectations and alternatives were discussed including, but not limited to, infection, bleeding, lid/brow ptosis, bruising, swelling, diplopia, temporary effects, incomplete chemical denervation and dissatisfaction with the cosmetic outcome. No guarantee or warranty was given or implied regarding longevity of results.
Periorbital Skin Units: 20
Expiration Date (Month Year): 08/2022
Additional Area 3 Location: necklace lines
Administered By (Optional): Susan Palacios
Price Per Unit In $ (Use Numbers Only, No Text Please.): 17
Map Statment: See attached map for complete details
Dilution (U/0.1 Cc): 5
Detail Level: Simple
Additional Area 2 Location: Chin
Postcare Instructions: Patient instructed to not lie down for 4 hours and limit physical activity for 24 hours. Patient instructed not to travel by airplane for 48 hours.
Bill Summary Price Listed Below, Or Bill Total Of Units X Price Per Unit?: Bill #Units x Price Per Unit
Use Map Statement For Sites (Optional): Yes
Additional Area 1 Location: sneer muscles
Glabellar Complex Units: 10

## 2020-03-04 ENCOUNTER — APPOINTMENT (RX ONLY)
Dept: URBAN - METROPOLITAN AREA CLINIC 12 | Facility: CLINIC | Age: 55
Setting detail: DERMATOLOGY
End: 2020-03-04

## 2020-03-04 DIAGNOSIS — Z42.8 ENCOUNTER FOR OTHER PLASTIC AND RECONSTRUCTIVE SURGERY FOLLOWING MEDICAL PROCEDURE OR HEALED INJURY: ICD-10-CM

## 2020-03-04 PROCEDURE — ? BOTOX

## 2020-03-04 NOTE — PROCEDURE: BOTOX
Administered By (Optional): Susan Palacios
Additional Area 2 Location: Chin
Expiration Date (Month Year): 08/2022
Platsyma Units: 0
Consent: Written consent was obtained prior to the procedure. Risks, benefits, expectations and alternatives were discussed including, but not limited to, infection, bleeding, lid/brow ptosis, bruising, swelling, diplopia, temporary effects, incomplete chemical denervation and dissatisfaction with the cosmetic outcome. No guarantee or warranty was given or implied regarding longevity of results.
Additional Area 3 Location: necklace lines
Use Map Statement For Sites (Optional): Yes
Forehead Units: 5
Lot #: [id number]
Additional Area 1 Location: sneer muscles
Map Statment: See attached map for complete details
Bill Summary Price Listed Below, Or Bill Total Of Units X Price Per Unit?: Bill #Units x Price Per Unit
Detail Level: Simple
Price Per Unit In $ (Use Numbers Only, No Text Please.): 14
Periorbital Skin Units: 10
Postcare Instructions: Patient instructed to not lie down for 4 hours and limit physical activity for 24 hours. Patient instructed not to travel by airplane for 48 hours.

## 2020-05-04 ENCOUNTER — APPOINTMENT (RX ONLY)
Dept: URBAN - METROPOLITAN AREA CLINIC 12 | Facility: CLINIC | Age: 55
Setting detail: DERMATOLOGY
End: 2020-05-04

## 2020-05-04 DIAGNOSIS — Z41.9 ENCOUNTER FOR PROCEDURE FOR PURPOSES OTHER THAN REMEDYING HEALTH STATE, UNSPECIFIED: ICD-10-CM

## 2020-05-04 PROCEDURE — ? BOTOX

## 2020-05-04 NOTE — PROCEDURE: BOTOX
Use Map Statement For Sites (Optional): Yes
Manuel Units: 0
Periorbital Skin Units: 15
Detail Level: Simple
Consent: Written consent was obtained prior to the procedure. Risks, benefits, expectations and alternatives were discussed including, but not limited to, infection, bleeding, lid/brow ptosis, bruising, swelling, diplopia, temporary effects, incomplete chemical denervation and dissatisfaction with the cosmetic outcome. No guarantee or warranty was given or implied regarding longevity of results.
Postcare Instructions: Patient instructed to not lie down for 4 hours and limit physical activity for 24 hours. Patient instructed not to travel by airplane for 48 hours.
Additional Area 1 Location: in the brow
Dilution (U/0.1 Cc): 5
Additional Area 3 Location: necklace lines
Price Per Unit In $ (Use Numbers Only, No Text Please.): 17
Lot #: [id number]
Map Statment: See attached map for complete details
Expiration Date (Month Year): 10/2022
Additional Area 2 Location: Chin
Bill Summary Price Listed Below, Or Bill Total Of Units X Price Per Unit?: Bill #Units x Price Per Unit
Administered By (Optional): Susan Palacios

## 2020-08-04 ENCOUNTER — APPOINTMENT (RX ONLY)
Dept: URBAN - METROPOLITAN AREA CLINIC 12 | Facility: CLINIC | Age: 55
Setting detail: DERMATOLOGY
End: 2020-08-04

## 2020-08-04 DIAGNOSIS — Z41.9 ENCOUNTER FOR PROCEDURE FOR PURPOSES OTHER THAN REMEDYING HEALTH STATE, UNSPECIFIED: ICD-10-CM

## 2020-08-04 PROCEDURE — ? BOTOX

## 2020-08-04 NOTE — PROCEDURE: BOTOX
Detail Level: Simple
Lot #: [id number]
Show Price In Note?: yes
Forehead Units: 5
Glabellar Complex Units: 10
Consent: Written consent was obtained prior to the procedure. Risks, benefits, expectations and alternatives were discussed including, but not limited to, infection, bleeding, lid/brow ptosis, bruising, swelling, diplopia, temporary effects, incomplete chemical denervation and dissatisfaction with the cosmetic outcome. No guarantee or warranty was given or implied regarding longevity of results.
Perioral Units: 0
Additional Area 3 Location: platysma bands
Expiration Date (Month Year): 04/2023
Periorbital Skin Units: 15
Price Per Unit In $ (Use Numbers Only, No Text Please.): 17
Postcare Instructions: Patient instructed to not lie down for 4 hours and limit physical activity for 24 hours. Patient instructed not to travel by airplane for 48 hours.
Administered By (Optional): Susan Palacios
Additional Area 2 Location: Chin
Additional Area 1 Location: tail end of brow
Bill Summary Price Listed Below, Or Bill Total Of Units X Price Per Unit?: Bill #Units x Price Per Unit
Map Statment: See attached map for complete details

## 2020-09-01 ENCOUNTER — APPOINTMENT (RX ONLY)
Dept: URBAN - METROPOLITAN AREA CLINIC 12 | Facility: CLINIC | Age: 55
Setting detail: DERMATOLOGY
End: 2020-09-01

## 2020-09-01 DIAGNOSIS — Z41.9 ENCOUNTER FOR PROCEDURE FOR PURPOSES OTHER THAN REMEDYING HEALTH STATE, UNSPECIFIED: ICD-10-CM

## 2020-09-01 DIAGNOSIS — Z42.8 ENCOUNTER FOR OTHER PLASTIC AND RECONSTRUCTIVE SURGERY FOLLOWING MEDICAL PROCEDURE OR HEALED INJURY: ICD-10-CM

## 2020-09-01 PROCEDURE — ? BOTOX

## 2020-09-01 PROCEDURE — ? JUVEDERM ULTRA XC INJECTION

## 2020-09-01 NOTE — PROCEDURE: JUVEDERM ULTRA XC INJECTION
Number Of Syringes (Required For Inventory): 1
Include Cannula Information In Note?: No
Additional Area 1 Location: upper and lower lip
Expiration Date (Month Year): 07/2021
Anesthesia Volume In Cc: 0
Additional Area 1 Volume In Cc: 0.2
Post-Care Instructions: Patient instructed to apply ice to reduce swelling.
Marionette Lines Filler Volume In Cc: 0.3
Detail Level: Detailed
Filler: Juvederm Ultra XC
Lot #: [id number]
Procedural Text: The filler was administered to the treatment areas noted above.
Price (Use Numbers Only, No Special Characters Or $): 395
Map Statment: See Attach Map for Complete Details
Consent: Written consent obtained. Risks include but not limited to bruising, beading, irregular texture, ulceration, infection, allergic reaction, scar formation, incomplete augmentation, temporary nature, procedural pain.

## 2020-09-01 NOTE — PROCEDURE: BOTOX
Nasal Root Units: 0
Show Price In Note?: yes
Detail Level: Simple
Price Per Unit In $ (Use Numbers Only, No Text Please.): 14
Map Statment: See attached map for complete details
Additional Area 1 Location: cheek attachment with striations
Lot #: [id number]
Bill Summary Price Listed Below, Or Bill Total Of Units X Price Per Unit?: Bill #Units x Price Per Unit
Consent: Written consent was obtained prior to the procedure. Risks, benefits, expectations and alternatives were discussed including, but not limited to, infection, bleeding, lid/brow ptosis, bruising, swelling, diplopia, temporary effects, incomplete chemical denervation and dissatisfaction with the cosmetic outcome. No guarantee or warranty was given or implied regarding longevity of results.
Forehead Units: 7.5
Administered By (Optional): Susan Palacios
Expiration Date (Month Year): 04/2023
Additional Area 2 Location: Chin
Dilution (U/0.1 Cc): 5
Postcare Instructions: Patient instructed to not lie down for 4 hours and limit physical activity for 24 hours. Patient instructed not to travel by airplane for 48 hours.
Additional Area 3 Location: lateral platysma bands

## 2020-12-09 ENCOUNTER — APPOINTMENT (RX ONLY)
Dept: URBAN - METROPOLITAN AREA CLINIC 12 | Facility: CLINIC | Age: 55
Setting detail: DERMATOLOGY
End: 2020-12-09

## 2020-12-09 DIAGNOSIS — Z41.9 ENCOUNTER FOR PROCEDURE FOR PURPOSES OTHER THAN REMEDYING HEALTH STATE, UNSPECIFIED: ICD-10-CM

## 2020-12-09 PROCEDURE — ? BOTOX

## 2020-12-09 NOTE — PROCEDURE: BOTOX
Dilution (U/0.1 Cc): 5
Additional Area 2 Location: Chin
Administered By (Optional): Susan Palacios
Additional Area 3 Units: 0
Additional Area 3 Location: necklace lines
Periorbital Skin Units: 20
Price Per Unit In $ (Use Numbers Only, No Text Please.): 17
Postcare Instructions: Patient instructed to not lie down for 4 hours and limit physical activity for 24 hours. Patient instructed not to travel by airplane for 48 hours.
Expiration Date (Month Year): 08/2023
Glabellar Complex Units: 10
Consent: Written consent was obtained prior to the procedure. Risks, benefits, expectations and alternatives were discussed including, but not limited to, infection, bleeding, lid/brow ptosis, bruising, swelling, diplopia, temporary effects, incomplete chemical denervation and dissatisfaction with the cosmetic outcome. No guarantee or warranty was given or implied regarding longevity of results.
Use Map Statement For Sites (Optional): Yes
Forehead Units: 7.5
Lot #: [id number]
Detail Level: Simple
Additional Area 1 Location: sneer muscles
Map Statment: See attached map for complete details
Bill Summary Price Listed Below, Or Bill Total Of Units X Price Per Unit?: Bill #Units x Price Per Unit

## 2021-03-24 ENCOUNTER — APPOINTMENT (RX ONLY)
Dept: URBAN - METROPOLITAN AREA CLINIC 12 | Facility: CLINIC | Age: 56
Setting detail: DERMATOLOGY
End: 2021-03-24

## 2021-03-24 DIAGNOSIS — Z41.9 ENCOUNTER FOR PROCEDURE FOR PURPOSES OTHER THAN REMEDYING HEALTH STATE, UNSPECIFIED: ICD-10-CM

## 2021-03-24 PROCEDURE — ? CONSULTATION - COMPREHENSIVE FACIAL ANALYSIS

## 2021-03-24 ASSESSMENT — LOCATION SIMPLE DESCRIPTION DERM
LOCATION SIMPLE: LEFT FOREHEAD
LOCATION SIMPLE: CHIN
LOCATION SIMPLE: LEFT ANTERIOR NECK
LOCATION SIMPLE: LEFT CHEEK
LOCATION SIMPLE: RIGHT CHEEK

## 2021-03-24 ASSESSMENT — LOCATION ZONE DERM
LOCATION ZONE: FACE
LOCATION ZONE: NECK

## 2021-03-24 ASSESSMENT — LOCATION DETAILED DESCRIPTION DERM
LOCATION DETAILED: LEFT CHIN
LOCATION DETAILED: LEFT INFERIOR CENTRAL MALAR CHEEK
LOCATION DETAILED: LEFT INFERIOR ANTERIOR NECK
LOCATION DETAILED: RIGHT INFERIOR MEDIAL MALAR CHEEK
LOCATION DETAILED: LEFT SUPERIOR MEDIAL FOREHEAD

## 2021-03-24 NOTE — PROCEDURE: CONSULTATION - COMPREHENSIVE FACIAL ANALYSIS
Platsymal Banding?: No
Lip Commissure Positions: Level
Skin Elasticity: Normal
Realistic Expectations?: Yes
Detail Level: Detailed
Mentocervical Angle: Obtuse
Occlusion: Class I

## 2021-04-06 ENCOUNTER — APPOINTMENT (RX ONLY)
Dept: URBAN - METROPOLITAN AREA CLINIC 12 | Facility: CLINIC | Age: 56
Setting detail: DERMATOLOGY
End: 2021-04-06

## 2021-04-06 DIAGNOSIS — Z41.9 ENCOUNTER FOR PROCEDURE FOR PURPOSES OTHER THAN REMEDYING HEALTH STATE, UNSPECIFIED: ICD-10-CM

## 2021-04-06 PROCEDURE — ? BOTOX

## 2021-04-06 NOTE — PROCEDURE: BOTOX
Periorbital Skin Units: 20
Show Price In Note?: yes
Postcare Instructions: Patient instructed to not lie down for 4 hours and limit physical activity for 24 hours. Patient instructed not to travel by airplane for 48 hours.
Platsyma Units: 0
Lot #: [id number]
Additional Area 3 Location: masseter muscles
Administered By (Optional): Susan Palacios
Price Per Unit In $ (Use Numbers Only, No Text Please.): 17
Bill Summary Price Listed Below, Or Bill Total Of Units X Price Per Unit?: Bill #Units x Price Per Unit
Detail Level: Simple
Expiration Date (Month Year): 12/2023
Forehead Units: 5
Glabellar Complex Units: 12.5
Consent: Written consent was obtained prior to the procedure. Risks, benefits, expectations and alternatives were discussed including, but not limited to, infection, bleeding, lid/brow ptosis, bruising, swelling, diplopia, temporary effects, incomplete chemical denervation and dissatisfaction with the cosmetic outcome. No guarantee or warranty was given or implied regarding longevity of results.
Map Statment: See attached map for complete details
Additional Area 2 Location: Chin

## 2021-06-29 ENCOUNTER — APPOINTMENT (RX ONLY)
Dept: URBAN - METROPOLITAN AREA CLINIC 12 | Facility: CLINIC | Age: 56
Setting detail: DERMATOLOGY
End: 2021-06-29

## 2021-06-29 DIAGNOSIS — Z41.9 ENCOUNTER FOR PROCEDURE FOR PURPOSES OTHER THAN REMEDYING HEALTH STATE, UNSPECIFIED: ICD-10-CM

## 2021-06-29 PROCEDURE — ? BOTOX

## 2021-06-29 NOTE — PROCEDURE: BOTOX
Periorbital Skin Units: 20
Show Price In Note?: yes
Additional Area 2 Units: 0
Postcare Instructions: Patient instructed to not lie down for 4 hours and limit physical activity for 24 hours. Patient instructed not to travel by airplane for 48 hours.
Lot #: [id number]
Administered By (Optional): Susan Palacios
Additional Area 1 Location: tail end of brows
Price Per Unit In $ (Use Numbers Only, No Text Please.): 17
Bill Summary Price Listed Below, Or Bill Total Of Units X Price Per Unit?: Bill #Units x Price Per Unit
Consent: Written consent was obtained prior to the procedure. Risks, benefits, expectations and alternatives were discussed including, but not limited to, infection, bleeding, lid/brow ptosis, bruising, swelling, diplopia, temporary effects, incomplete chemical denervation and dissatisfaction with the cosmetic outcome. No guarantee or warranty was given or implied regarding longevity of results.
Additional Area 2 Location: Chin
Forehead Units: 7.5
Dilution (U/0.1 Cc): 5
Expiration Date (Month Year): 09/2023
Detail Level: Simple
Map Statment: See attached map for complete details
Glabellar Complex Units: 10
Additional Area 3 Location: masseter muscles

## 2021-07-28 ENCOUNTER — LAB OUTSIDE AN ENCOUNTER (OUTPATIENT)
Dept: URBAN - METROPOLITAN AREA CLINIC 98 | Facility: CLINIC | Age: 56
End: 2021-07-28

## 2021-07-28 ENCOUNTER — OFFICE VISIT (OUTPATIENT)
Dept: URBAN - METROPOLITAN AREA CLINIC 98 | Facility: CLINIC | Age: 56
End: 2021-07-28
Payer: COMMERCIAL

## 2021-07-28 ENCOUNTER — DASHBOARD ENCOUNTERS (OUTPATIENT)
Age: 56
End: 2021-07-28

## 2021-07-28 ENCOUNTER — WEB ENCOUNTER (OUTPATIENT)
Dept: URBAN - METROPOLITAN AREA CLINIC 98 | Facility: CLINIC | Age: 56
End: 2021-07-28

## 2021-07-28 VITALS
WEIGHT: 124.3 LBS | SYSTOLIC BLOOD PRESSURE: 136 MMHG | TEMPERATURE: 97.3 F | BODY MASS INDEX: 21.22 KG/M2 | HEIGHT: 64 IN | HEART RATE: 101 BPM | DIASTOLIC BLOOD PRESSURE: 92 MMHG

## 2021-07-28 DIAGNOSIS — K63.5 COLON POLYP: ICD-10-CM

## 2021-07-28 DIAGNOSIS — Z91.89 COLON CANCER HIGH RISK: ICD-10-CM

## 2021-07-28 DIAGNOSIS — R13.10 DYSPHAGIA: ICD-10-CM

## 2021-07-28 PROCEDURE — 99203 OFFICE O/P NEW LOW 30 MIN: CPT | Performed by: INTERNAL MEDICINE

## 2021-07-28 RX ORDER — FAMOTIDINE 40 MG/1
1 TAB TABLET, FILM COATED ORAL BID
Qty: 60 TABLET | Refills: 11 | OUTPATIENT
Start: 2021-07-28

## 2021-07-28 RX ORDER — POLYETHYLENE GLYCOL 3350, SODIUM SULFATE, SODIUM CHLORIDE, POTASSIUM CHLORIDE, ASCORBIC ACID, SODIUM ASCORBATE 140-9-5.2G
1 KIT KIT ORAL ONCE
Qty: 1 | Refills: 1 | OUTPATIENT
Start: 2021-07-28 | End: 2021-07-30

## 2021-07-28 NOTE — HPI-TODAY'S VISIT:
Today on 7/28/2021, pt reports that, pt has h/o EOE on occasion if she eats certain meats or swallow pills incorrectly.  She has dysphagia, andrae to pills and certain foods (like solids).  She has occasional abdominal pain, in RLQ area.  Has regular BM, no blood in the stool or diarrhea. No weight loss.  No NSAIDs, occasionally Maxalt. . PMH: h/o EOE

## 2021-08-06 PROBLEM — 40739000 DYSPHAGIA: Status: ACTIVE | Noted: 2021-07-28

## 2021-09-09 ENCOUNTER — CLAIMS CREATED FROM THE CLAIM WINDOW (OUTPATIENT)
Dept: URBAN - METROPOLITAN AREA CLINIC 4 | Facility: CLINIC | Age: 56
End: 2021-09-09
Payer: COMMERCIAL

## 2021-09-09 ENCOUNTER — OFFICE VISIT (OUTPATIENT)
Dept: URBAN - METROPOLITAN AREA SURGERY CENTER 18 | Facility: SURGERY CENTER | Age: 56
End: 2021-09-09
Payer: COMMERCIAL

## 2021-09-09 DIAGNOSIS — K20.0 EOSINOPHILIC ESOPHAGITIS: ICD-10-CM

## 2021-09-09 DIAGNOSIS — K31.89 ACQUIRED DEFORMITY OF DUODENUM: ICD-10-CM

## 2021-09-09 DIAGNOSIS — K20.0 ALLERGIC EOSINOPHILIC ESOPHAGITIS: ICD-10-CM

## 2021-09-09 DIAGNOSIS — Z86.010 COLON POLYP HISTORY: ICD-10-CM

## 2021-09-09 DIAGNOSIS — R13.10 DYSPHAGIA: ICD-10-CM

## 2021-09-09 DIAGNOSIS — K31.89 SUBEPITHELIAL MASS OF STOMACH: ICD-10-CM

## 2021-09-09 PROCEDURE — 43239 EGD BIOPSY SINGLE/MULTIPLE: CPT | Performed by: INTERNAL MEDICINE

## 2021-09-09 PROCEDURE — G8907 PT DOC NO EVENTS ON DISCHARG: HCPCS | Performed by: INTERNAL MEDICINE

## 2021-09-09 PROCEDURE — 45378 DIAGNOSTIC COLONOSCOPY: CPT | Performed by: INTERNAL MEDICINE

## 2021-09-09 PROCEDURE — 88305 TISSUE EXAM BY PATHOLOGIST: CPT | Performed by: PATHOLOGY

## 2021-09-21 ENCOUNTER — APPOINTMENT (RX ONLY)
Dept: URBAN - METROPOLITAN AREA CLINIC 12 | Facility: CLINIC | Age: 56
Setting detail: DERMATOLOGY
End: 2021-09-21

## 2021-09-21 DIAGNOSIS — Z41.9 ENCOUNTER FOR PROCEDURE FOR PURPOSES OTHER THAN REMEDYING HEALTH STATE, UNSPECIFIED: ICD-10-CM

## 2021-09-21 PROCEDURE — ? BOTOX

## 2021-09-21 NOTE — PROCEDURE: BOTOX
Use Map Statement For Sites (Optional): Yes
Dilution (U/0.1 Cc): 5
Map Statment: See attached map for complete details
Manuel Units: 0
Detail Level: Simple
Additional Area 2 Location: chin
Glabellar Complex Units: 12.5
Consent: Written consent was obtained prior to the procedure. Risks, benefits, expectations and alternatives were discussed including, but not limited to, infection, bleeding, lid/brow ptosis, bruising, swelling, diplopia, temporary effects, incomplete chemical denervation and dissatisfaction with the cosmetic outcome. No guarantee or warranty was given or implied regarding longevity of results.
Additional Area 1 Location: lateral platysmal bands
Postcare Instructions: Patient instructed to not lie down for 4 hours and limit physical activity for 24 hours. Patient instructed not to travel by airplane for 48 hours.
Lot #: [id number]
Expiration Date (Month Year): 11/2023
Bill Summary Price Listed Below, Or Bill Total Of Units X Price Per Unit?: Bill #Units x Price Per Unit
Periorbital Skin Units: 20
Administered By (Optional): Susan Palacios
Additional Area 3 Location: necklace lines
Price Per Unit In $ (Use Numbers Only, No Text Please.): 17

## 2022-01-19 ENCOUNTER — APPOINTMENT (RX ONLY)
Dept: URBAN - METROPOLITAN AREA CLINIC 12 | Facility: CLINIC | Age: 57
Setting detail: DERMATOLOGY
End: 2022-01-19

## 2022-01-19 DIAGNOSIS — Z42.8 ENCOUNTER FOR OTHER PLASTIC AND RECONSTRUCTIVE SURGERY FOLLOWING MEDICAL PROCEDURE OR HEALED INJURY: ICD-10-CM

## 2022-01-19 PROCEDURE — ? BOTOX

## 2022-01-19 NOTE — PROCEDURE: BOTOX
Show Price In Note?: yes
Map Statment: See attached map for complete details
Forehead Units: 10
Glabellar Complex Units: 0
Lot #: [id number]
Bill Summary Price Listed Below, Or Bill Total Of Units X Price Per Unit?: Bill #Units x Price Per Unit
Detail Level: Simple
Additional Area 3 Location: necklace lines
Expiration Date (Month Year): 04/2024
Consent: Written consent was obtained prior to the procedure. Risks, benefits, expectations and alternatives were discussed including, but not limited to, infection, bleeding, lid/brow ptosis, bruising, swelling, diplopia, temporary effects, incomplete chemical denervation and dissatisfaction with the cosmetic outcome. No guarantee or warranty was given or implied regarding longevity of results.
Postcare Instructions: Patient instructed to not lie down for 4 hours and limit physical activity for 24 hours. Patient instructed not to travel by airplane for 48 hours.
Periorbital Skin Units: 20
Dilution (U/0.1 Cc): 5
Additional Area 2 Location: chin
Additional Area 1 Location: nasalis depressor
Price Per Unit In $ (Use Numbers Only, No Text Please.): 17
Administered By (Optional): Susan Palacios

## 2022-02-02 ENCOUNTER — APPOINTMENT (RX ONLY)
Dept: URBAN - METROPOLITAN AREA CLINIC 12 | Facility: CLINIC | Age: 57
Setting detail: DERMATOLOGY
End: 2022-02-02

## 2022-02-02 DIAGNOSIS — Z41.9 ENCOUNTER FOR PROCEDURE FOR PURPOSES OTHER THAN REMEDYING HEALTH STATE, UNSPECIFIED: ICD-10-CM

## 2022-02-02 PROCEDURE — ? CONSULTATION - FILLERS

## 2022-02-02 PROCEDURE — ? RESTYLANE KYSSE INJECTION

## 2022-02-02 PROCEDURE — ? RESTYLANE DEFYNE INJECTION

## 2022-02-02 NOTE — PROCEDURE: RESTYLANE DEFYNE INJECTION
Jawline Filler Volume In Cc: 0
Filler: Restylane Defyne
Anesthesia Type: 1% lidocaine with epinephrine
Map Statement: See Attach Map for Complete Details
Use Map Statement For Sites (Optional): No
Procedural Text: The filler was administered to the treatment areas noted above.
Consent: Written consent obtained. Risks include but not limited to bruising, beading, irregular texture, ulceration, infection, allergic reaction, scar formation, incomplete augmentation, temporary nature, procedural pain.
Additional Anesthesia Volume In Cc: 6
Expiration Date (Month Year): 2/28/23
Anesthesia Volume In Cc: 0.5
Marionette Lines Filler Volume In Cc: 1
Post-Care Instructions: Patient instructed to apply ice to reduce swelling.
Price (Use Numbers Only, No Special Characters Or $): 966
Lot #: [id number]
Detail Level: Detailed

## 2022-02-02 NOTE — PROCEDURE: CONSULTATION - FILLERS
Labiomental Sulcus Primary Filler Volume In Cc (Estimated): 0
Additional Area 2 Location: submentum
Additional Area 1 Location: chin
Send Procedure Quote As Charge: No
Detail Level: Detailed

## 2022-02-02 NOTE — HPI: COSMETIC (FILLERS)
Have You Had Fillers Before?: has had fillers
Additional History: Patient states she had lip  2019 and recently had this dissolved at an outside practice one week prior to today’s visit. Patient is unhappy with the results and feels she has lost more volume. She wants a correction.

## 2022-02-02 NOTE — PROCEDURE: RESTYLANE KYSSE INJECTION
Map Statement: See Attach Map for Complete Details
Marionette Lines Filler Volume In Cc: 0
Price (Use Numbers Only, No Special Characters Or $): 715
Procedural Text: The filler was administered to the treatment areas noted above.
Lot #: [id number]
Consent: Written consent obtained. Risks include but not limited to bruising, beading, irregular texture, ulceration, infection, allergic reaction, scar formation, incomplete augmentation, temporary nature, procedural pain.
Vermilion Lips Filler Volume In Cc: 0.7
Use Map Statement For Sites (Optional): No
Detail Level: Detailed
Filler: Restylane Kysse
Post-Care Instructions: Patient instructed to apply ice to reduce swelling.
Expiration Date (Month Year): 3/31/23
Additional Anesthesia Volume In Cc: 6
Anesthesia Volume In Cc: 0.5
Number Of Syringes (Required For Inventory): 1

## 2022-02-21 ENCOUNTER — APPOINTMENT (RX ONLY)
Dept: URBAN - METROPOLITAN AREA CLINIC 12 | Facility: CLINIC | Age: 57
Setting detail: DERMATOLOGY
End: 2022-02-21

## 2022-02-21 DIAGNOSIS — Z428 OTHER AFTERCARE INVOLVING THE USE OF PLASTIC SURGERY: ICD-10-CM

## 2022-02-21 DIAGNOSIS — Z41.9 ENCOUNTER FOR PROCEDURE FOR PURPOSES OTHER THAN REMEDYING HEALTH STATE, UNSPECIFIED: ICD-10-CM

## 2022-02-21 PROCEDURE — ? CONSULTATION - FILLERS

## 2022-02-21 PROCEDURE — ? JUVEDERM VOLBELLA INJECTION

## 2022-02-21 PROCEDURE — ? RESTYLANE KYSSE INJECTION

## 2022-02-21 PROCEDURE — ? DIAGNOSIS COMMENT

## 2022-02-21 NOTE — PROCEDURE: RESTYLANE KYSSE INJECTION
Decollete Filler Volume In Cc: 0
Consent: Written consent obtained. Risks include but not limited to bruising, beading, irregular texture, ulceration, infection, allergic reaction, scar formation, incomplete augmentation, temporary nature, procedural pain.
Vermilion Lips Filler Volume In Cc: 0.4
Filler: Restylane Kysse
Include Cannula Information In Note?: No
Expiration Date (Month Year): 3/31/23
Post-Care Instructions: Patient instructed to apply ice to reduce swelling.
Detail Level: Detailed
Additional Anesthesia Volume In Cc: 6
Anesthesia Volume In Cc: 0.5
Procedural Text: The filler was administered to the treatment areas noted above.
Map Statement: See Attach Map for Complete Details
Lot #: [id number]

## 2022-02-21 NOTE — PROCEDURE: DIAGNOSIS COMMENT
Render Risk Assessment In Note?: no
Detail Level: Simple
Comment: 0.4mL residual Restylane Kysse used

## 2022-02-21 NOTE — PROCEDURE: JUVEDERM VOLBELLA INJECTION
Marionette Lines Filler Volume In Cc: 0
Use Map Statement For Sites (Optional): No
Anesthesia Volume In Cc: 0.5
Lot #: [id number]
Procedural Text: The filler was administered to the treatment areas noted above.
Detail Level: Detailed
Additional Area 2 Location: chin
Consent: Written consent obtained. Risks include but not limited to bruising, beading, irregular texture, ulceration, infection, allergic reaction, scar formation, incomplete augmentation, temporary nature, procedural pain.
Post-Care Instructions: Patient instructed to apply ice to reduce swelling.
Filler: Juvederm Volbella XC
Expiration Date (Month Year): 5/17/23
Anesthesia Type: 1% lidocaine with epinephrine
Additional Area 1 Location: perioral
Price (Use Numbers Only, No Special Characters Or $): 395
Map Statment: See Attach Map for Complete Details
Number Of Syringes (Required For Inventory): 1
Additional Anesthesia Volume In Cc: 6

## 2022-02-21 NOTE — PROCEDURE: CONSULTATION - FILLERS
Additional Area 1 Primary Filler Volume In Cc (Estimated): 0
Temples Filler (Primary): Sculptra
Send Procedure Quote As Charge: No
Temples Primary Filler Volume In Cc (Estimated): 1
Detail Level: Detailed
Additional Area 2 Location: submentum
Jawline Filler (Primary): Restylane Lyft (Perlane-L)
Additional Area 1 Location: neck
Jawline Primary Filler Volume In Cc (Estimated): 1.5

## 2022-03-07 ENCOUNTER — APPOINTMENT (RX ONLY)
Dept: URBAN - METROPOLITAN AREA CLINIC 12 | Facility: CLINIC | Age: 57
Setting detail: DERMATOLOGY
End: 2022-03-07

## 2022-03-07 DIAGNOSIS — Z41.9 ENCOUNTER FOR PROCEDURE FOR PURPOSES OTHER THAN REMEDYING HEALTH STATE, UNSPECIFIED: ICD-10-CM

## 2022-03-17 ENCOUNTER — APPOINTMENT (RX ONLY)
Dept: URBAN - METROPOLITAN AREA CLINIC 12 | Facility: CLINIC | Age: 57
Setting detail: DERMATOLOGY
End: 2022-03-17

## 2022-03-17 DIAGNOSIS — Z41.9 ENCOUNTER FOR PROCEDURE FOR PURPOSES OTHER THAN REMEDYING HEALTH STATE, UNSPECIFIED: ICD-10-CM

## 2022-03-17 PROCEDURE — ? BOTOX

## 2022-03-17 NOTE — PROCEDURE: BOTOX
Glabellar Complex Units: 10
Dilution (U/0.1 Cc): 5
Expiration Date (Month Year): 05/2024
Administered By (Optional): Susan Palacios
Additional Area 3 Units: 0
Detail Level: Simple
Additional Area 1 Location: nasalis depressor
Periorbital Skin Units: 17.5
Additional Area 2 Location: chin
Additional Area 3 Location: lateral platysmal bands
Show Price In Note?: yes
Postcare Instructions: Patient instructed to not lie down for 4 hours and limit physical activity for 24 hours. Patient instructed not to travel by airplane for 48 hours.
Map Statment: See attached map for complete details
Lot #: [id number]
Bill Summary Price Listed Below, Or Bill Total Of Units X Price Per Unit?: Bill #Units x Price Per Unit
Price Per Unit In $ (Use Numbers Only, No Text Please.): 17
Forehead Units: 7.5
Consent: Written consent was obtained prior to the procedure. Risks, benefits, expectations and alternatives were discussed including, but not limited to, infection, bleeding, lid/brow ptosis, bruising, swelling, diplopia, temporary effects, incomplete chemical denervation and dissatisfaction with the cosmetic outcome. No guarantee or warranty was given or implied regarding longevity of results.

## 2022-03-28 ENCOUNTER — APPOINTMENT (RX ONLY)
Dept: URBAN - METROPOLITAN AREA CLINIC 12 | Facility: CLINIC | Age: 57
Setting detail: DERMATOLOGY
End: 2022-03-28

## 2022-04-19 ENCOUNTER — APPOINTMENT (RX ONLY)
Dept: URBAN - METROPOLITAN AREA CLINIC 12 | Facility: CLINIC | Age: 57
Setting detail: DERMATOLOGY
End: 2022-04-19

## 2022-04-19 DIAGNOSIS — Z41.9 ENCOUNTER FOR PROCEDURE FOR PURPOSES OTHER THAN REMEDYING HEALTH STATE, UNSPECIFIED: ICD-10-CM

## 2022-04-19 PROCEDURE — ? JUVEDERM ULTRA INJECTION

## 2022-04-19 NOTE — PROCEDURE: JUVEDERM ULTRA INJECTION
Vermilion Lips Filler Volume In Cc: 0.1
Temple Hollows Filler Volume In Cc: 0
Number Of Syringes (Required For Inventory): 1
Additional Anesthesia Volume In Cc: 6
Additional Area 1 Location: perioral lines
Lot #: [id number]
Procedural Text: The filler was administered to the treatment areas noted above.
Anesthesia Volume In Cc: 0.5
Consent: Written consent obtained. Risks include but not limited to bruising, beading, irregular texture, ulceration, infection, allergic reaction, scar formation, incomplete augmentation, temporary nature, procedural pain.
Filler: Juvederm Ultra
Map Statement: See Attach Map for Complete Details
Detail Level: Detailed
Price (Use Numbers Only, No Special Characters Or $): 395
Use Map Statement For Sites (Optional): No
Expiration Date (Month Year): 3/4/23
Anesthesia Type: 1% lidocaine with epinephrine
Post-Care Instructions: Patient instructed to apply ice to reduce swelling.

## 2022-06-22 ENCOUNTER — APPOINTMENT (RX ONLY)
Dept: URBAN - METROPOLITAN AREA CLINIC 12 | Facility: CLINIC | Age: 57
Setting detail: DERMATOLOGY
End: 2022-06-22

## 2022-06-22 DIAGNOSIS — Z41.9 ENCOUNTER FOR PROCEDURE FOR PURPOSES OTHER THAN REMEDYING HEALTH STATE, UNSPECIFIED: ICD-10-CM

## 2022-06-22 PROCEDURE — ? BOTOX

## 2022-06-22 PROCEDURE — ? OTHER (COSMETIC)

## 2022-06-22 NOTE — PROCEDURE: BOTOX
Platsyma Units: 0
Additional Area 3 Location: lateral platysmal bands
Lot #: [id number]
Use Map Statement For Sites (Optional): Yes
Glabellar Complex Units: 10
Periorbital Skin Units: 25
Detail Level: Simple
Bill Summary Price Listed Below, Or Bill Total Of Units X Price Per Unit?: Bill #Units x Price Per Unit
Administered By (Optional): Susan Palacios
Postcare Instructions: Patient instructed to not lie down for 4 hours and limit physical activity for 24 hours. Patient instructed not to travel by airplane for 48 hours.
Map Statment: See attached map for complete details
Dilution (U/0.1 Cc): 5
Price Per Unit In $ (Use Numbers Only, No Text Please.): 17
Additional Area 2 Location: chin
Forehead Units: 7.5
Expiration Date (Month Year): 10/2023
Additional Area 1 Location: upper lip
Consent: Written consent was obtained prior to the procedure. Risks, benefits, expectations and alternatives were discussed including, but not limited to, infection, bleeding, lid/brow ptosis, bruising, swelling, diplopia, temporary effects, incomplete chemical denervation and dissatisfaction with the cosmetic outcome. No guarantee or warranty was given or implied regarding longevity of results.

## 2022-06-22 NOTE — HPI: COSMETIC (FILLERS)
Have You Had Fillers Before?: has had fillers Chief complaint:   Patient is a 78y old  Male who presents with a chief complaint of Mechanical Fall on ASA, Transfer from [place], [facility] (26 Oct 2021 03:15)    HPI:  78M, history of HLD on medications and history of CABG in 5/2020 on ASA post procedure, had a fall today on gravel and hit the back of his head, no LOC, presented to [place] and was discovered to have a SAH.  Transferred to [facility] for tertiary care.  Denies headache, denies pain elsewhere, denies dizziness, blurred vision.   (26 Oct 2021 03:00)        24hr EVENTS:      ROS: [ ]  Unable to assess due to mental status   All other systems negative    -----------------------------------------------------------------------------------------------------------------------------------------------------------------------------------  ICU Vital Signs Last 24 Hrs  T(C): 36.6 (26 Oct 2021 07:52), Max: 36.8 (26 Oct 2021 02:35)  T(F): 97.8 (26 Oct 2021 07:52), Max: 98.3 (26 Oct 2021 02:35)  HR: 68 (26 Oct 2021 08:00) (68 - 83)  BP: 122/75 (26 Oct 2021 08:00) (122/75 - 167/86)  BP(mean): 89 (26 Oct 2021 08:00) (89 - 110)  ABP: --  ABP(mean): --  RR: 12 (26 Oct 2021 08:00) (12 - 21)  SpO2: 94% (26 Oct 2021 08:00) (94% - 97%)      I&O's Summary    25 Oct 2021 07:01  -  26 Oct 2021 07:00  --------------------------------------------------------  IN: 75 mL / OUT: 0 mL / NET: 75 mL        MEDICATIONS  (STANDING):  atorvastatin 80 milliGRAM(s) Oral at bedtime  lamoTRIgine 100 milliGRAM(s) Oral daily  levETIRAcetam 500 milliGRAM(s) Oral every 12 hours  QUEtiapine 25 milliGRAM(s) Oral at bedtime  sodium chloride 0.9%. 1000 milliLiter(s) (75 mL/Hr) IV Continuous <Continuous>      RESPIRATORY:        IMAGING:   Recent imaging studies were reviewed.    LAB RESULTS:                          14.1   9.17  )-----------( 150      ( 26 Oct 2021 02:57 )             41.8       PT/INR - ( 26 Oct 2021 02:57 )   PT: 13.4 sec;   INR: 1.16 ratio         PTT - ( 26 Oct 2021 02:57 )  PTT:28.0 sec    10-26    134<L>  |  97<L>  |  19.6  ----------------------------<  86  4.7   |  22.0  |  0.99    Ca    9.1      26 Oct 2021 02:57    TPro  7.1  /  Alb  4.4  /  TBili  0.6  /  DBili  x   /  AST  26  /  ALT  16  /  AlkPhos  81  10-26        -----------------------------------------------------------------------------------------------------------------------------------------------------------------------------------  PHYSICAL EXAM:  General: Calm, laying in bed  HEENT: MMM  Neuro:  -Mental status- No acute distress, AOx3, conversational, following commands  -CN- PERRL 3mm, EOMI, tongue midline, face symmetric  -Motor- full strength in all ext  -Sensation- intact to LT   -Coordination- no dysmetria noted    CV: RRR  Pulm: Clear to auscultation  Abd: Soft, nontender, nondistended  Ext: No edema  Skin: warm, dry   Chief complaint:   Patient is a 78y old  Male who presents with a chief complaint of Mechanical Fall on ASA, Transfer from [place], [facility] (26 Oct 2021 03:15)    HPI:  78M, history of HLD on medications and history of CABG in 5/2020 on ASA post procedure, had a fall today on gravel and hit the back of his head, no LOC, presented to [place] and was discovered to have a SAH.  Transferred to [facility] for tertiary care.  Denies headache, denies pain elsewhere, denies dizziness, blurred vision.   (26 Oct 2021 03:00)    24hr EVENTS:  admitted    ROS: no complaints  All other systems negative    -----------------------------------------------------------------------------------------------------------------------------------------------------------------------------------  ICU Vital Signs Last 24 Hrs  T(C): 36.6 (26 Oct 2021 07:52), Max: 36.8 (26 Oct 2021 02:35)  T(F): 97.8 (26 Oct 2021 07:52), Max: 98.3 (26 Oct 2021 02:35)  HR: 68 (26 Oct 2021 08:00) (68 - 83)  BP: 122/75 (26 Oct 2021 08:00) (122/75 - 167/86)  BP(mean): 89 (26 Oct 2021 08:00) (89 - 110)  ABP: --  ABP(mean): --  RR: 12 (26 Oct 2021 08:00) (12 - 21)  SpO2: 94% (26 Oct 2021 08:00) (94% - 97%)      I&O's Summary    25 Oct 2021 07:01  -  26 Oct 2021 07:00  --------------------------------------------------------  IN: 75 mL / OUT: 0 mL / NET: 75 mL      MEDICATIONS  (STANDING):  atorvastatin 80 milliGRAM(s) Oral at bedtime  lamoTRIgine 100 milliGRAM(s) Oral daily  levETIRAcetam 500 milliGRAM(s) Oral every 12 hours  QUEtiapine 25 milliGRAM(s) Oral at bedtime  sodium chloride 0.9%. 1000 milliLiter(s) (75 mL/Hr) IV Continuous <Continuous>      IMAGING:   Recent imaging studies were reviewed.    LAB RESULTS:                          14.1   9.17  )-----------( 150      ( 26 Oct 2021 02:57 )             41.8       PT/INR - ( 26 Oct 2021 02:57 )   PT: 13.4 sec;   INR: 1.16 ratio         PTT - ( 26 Oct 2021 02:57 )  PTT:28.0 sec    10-26    134<L>  |  97<L>  |  19.6  ----------------------------<  86  4.7   |  22.0  |  0.99    Ca    9.1      26 Oct 2021 02:57    TPro  7.1  /  Alb  4.4  /  TBili  0.6  /  DBili  x   /  AST  26  /  ALT  16  /  AlkPhos  81  10-26        -----------------------------------------------------------------------------------------------------------------------------------------------------------------------------------  PHYSICAL EXAM:  General: Calm, laying in bed  HEENT: MMM  Neuro:  -Mental status- No acute distress, AOx3, conversational, following commands  -CN- PERRL 3mm, EOMI, tongue midline, face symmetric  -Motor- full strength in all ext  -Sensation- intact to LT   -Coordination- no dysmetria noted    CV: RRR  Pulm: Clear to auscultation  Abd: Soft, nontender, nondistended  Ext: No edema  Skin: warm, dry

## 2022-07-14 ENCOUNTER — APPOINTMENT (RX ONLY)
Dept: URBAN - METROPOLITAN AREA CLINIC 12 | Facility: CLINIC | Age: 57
Setting detail: DERMATOLOGY
End: 2022-07-14

## 2022-07-14 ENCOUNTER — RX ONLY (OUTPATIENT)
Age: 57
Setting detail: RX ONLY
End: 2022-07-14

## 2022-07-14 DIAGNOSIS — Z41.9 ENCOUNTER FOR PROCEDURE FOR PURPOSES OTHER THAN REMEDYING HEALTH STATE, UNSPECIFIED: ICD-10-CM

## 2022-07-14 PROCEDURE — ? OTHER (COSMETIC)

## 2022-07-14 PROCEDURE — ? HYALURONIDASE INJECTION

## 2022-07-14 RX ORDER — DOXYCYCLINE HYCLATE 100 MG/1
CAPSULE, GELATIN COATED ORAL
Qty: 60 | Refills: 1 | Status: ERX | COMMUNITY
Start: 2022-07-14

## 2022-07-14 ASSESSMENT — LOCATION DETAILED DESCRIPTION DERM: LOCATION DETAILED: RIGHT SUPERIOR VERMILION LIP

## 2022-07-14 ASSESSMENT — LOCATION SIMPLE DESCRIPTION DERM: LOCATION SIMPLE: RIGHT LIP

## 2022-07-14 ASSESSMENT — LOCATION ZONE DERM: LOCATION ZONE: LIP

## 2022-07-14 NOTE — PROCEDURE: HYALURONIDASE INJECTION
Administered By (Optional): Susan Palacios RN
Consent: The risks of the procedure including pain, burning, contour defects and dimpling of the skin, and the need for multiple treatments were reviewed with the patient prior to the injection.
Hyaluronidase Preparation: Hylenex
Total Volume (Ccs): 0.1
Detail Level: Detailed
Price (Use Numbers Only, No Special Characters Or $): 0

## 2022-07-14 NOTE — PROCEDURE: OTHER (COSMETIC)
Detail Level: Simple
Sticky Other (Free Text): Belotero\\nLot: [id number]\\nExp: 12/2022\\n0.1 to perioral region \\n\\nBotox \\nLot: [id number]\\nExp: 05/2024\\n15 u to crows feet
Price $ (Use Numbers Only, No Text Please.): 0
Other (Free Text): No charge per Susan Palacios RN

## 2022-07-15 ENCOUNTER — APPOINTMENT (RX ONLY)
Dept: URBAN - METROPOLITAN AREA CLINIC 12 | Facility: CLINIC | Age: 57
Setting detail: DERMATOLOGY
End: 2022-07-15

## 2022-07-15 DIAGNOSIS — Z41.9 ENCOUNTER FOR PROCEDURE FOR PURPOSES OTHER THAN REMEDYING HEALTH STATE, UNSPECIFIED: ICD-10-CM

## 2022-07-15 PROCEDURE — ? HYALURONIDASE INJECTION

## 2022-07-15 PROCEDURE — ? OTHER (COSMETIC)

## 2022-07-15 ASSESSMENT — LOCATION SIMPLE DESCRIPTION DERM: LOCATION SIMPLE: LEFT UPPER LIP

## 2022-07-15 ASSESSMENT — LOCATION DETAILED DESCRIPTION DERM: LOCATION DETAILED: LEFT SUPERIOR VERMILION BORDER

## 2022-07-15 ASSESSMENT — LOCATION ZONE DERM: LOCATION ZONE: LIP

## 2022-07-15 NOTE — PROCEDURE: OTHER (COSMETIC)
Other (Free Text): No charge per Susan Palacios RN
Price $ (Use Numbers Only, No Text Please.): 0
Sticky Other (Free Text): Sample Giorgio \\nLot: [id number]\\nExp: 12/2022\\n0.1 cc to depression above upper lip
Detail Level: Simple

## 2022-07-15 NOTE — PROCEDURE: HYALURONIDASE INJECTION
Consent: The risks of the procedure including pain, burning, contour defects and dimpling of the skin, and the need for multiple treatments were reviewed with the patient prior to the injection.
Hyaluronidase Preparation: Hylenex
Price (Use Numbers Only, No Special Characters Or $): 0
Administered By (Optional): Susan Palacios RN
Detail Level: Detailed
Total Volume (Ccs): 0.1

## 2022-08-08 ENCOUNTER — APPOINTMENT (RX ONLY)
Dept: URBAN - METROPOLITAN AREA CLINIC 12 | Facility: CLINIC | Age: 57
Setting detail: DERMATOLOGY
End: 2022-08-08

## 2022-08-08 DIAGNOSIS — Z41.9 ENCOUNTER FOR PROCEDURE FOR PURPOSES OTHER THAN REMEDYING HEALTH STATE, UNSPECIFIED: ICD-10-CM

## 2022-08-08 PROCEDURE — ? OTHER (COSMETIC)

## 2022-08-08 PROCEDURE — ? BELOTERO INJECTION

## 2022-08-08 PROCEDURE — ? BOTOX

## 2022-08-08 PROCEDURE — ? HYALURONIDASE INJECTION

## 2022-08-08 ASSESSMENT — LOCATION ZONE DERM: LOCATION ZONE: LIP

## 2022-08-08 ASSESSMENT — LOCATION SIMPLE DESCRIPTION DERM: LOCATION SIMPLE: LEFT LIP

## 2022-08-08 ASSESSMENT — LOCATION DETAILED DESCRIPTION DERM: LOCATION DETAILED: LEFT UPPER CUTANEOUS LIP

## 2022-08-08 NOTE — PROCEDURE: BELOTERO INJECTION
Brows Filler Volume In Cc: 0
Additional Area 1 Volume In Cc: 0.1
Additional Area 4 Location: lateral orbital rim
Additional Area 2 Location: oral commissures
Use Map Statement For Sites (Optional): No
Procedural Text: The filler was administered to the treatment areas noted above.
Number Of Syringes (Required For Inventory): 1
Detail Level: Detailed
Expiration Date (Month Year): 12/2022
Consent: Written consent obtained. Risks include but not limited to bruising, beading, irregular texture, ulceration, infection, allergic reaction, scar formation, incomplete augmentation, temporary nature, procedural pain.
Map Statement: See Attach Map for Complete Details
Additional Area 5 Location: necklace lines
Additional Area 3 Location: glabellar static rhytids
Filler: Belotero
Post-Care Instructions: Patient instructed to apply ice to reduce swelling.
Price (Use Numbers Only, No Special Characters Or $): 90
Additional Area 1 Location: perioral lines
Lot #: [id number]

## 2022-08-08 NOTE — PROCEDURE: BOTOX
Glabellar Complex Units: 12.5
Perioral Units: 0
Additional Area 2 Location: chin
Additional Area 3 Location: lateral platysmal bands
Expiration Date (Month Year): 06/2024
Forehead Units: 10
Consent: Written consent was obtained prior to the procedure. Risks, benefits, expectations and alternatives were discussed including, but not limited to, infection, bleeding, lid/brow ptosis, bruising, swelling, diplopia, temporary effects, incomplete chemical denervation and dissatisfaction with the cosmetic outcome. No guarantee or warranty was given or implied regarding longevity of results.
Additional Area 1 Location: upper lip
Dilution (U/0.1 Cc): 5
Price Per Unit In $ (Use Numbers Only, No Text Please.): 17
Show Price In Note?: yes
Periorbital Skin Units: 17.5
Bill Summary Price Listed Below, Or Bill Total Of Units X Price Per Unit?: Bill #Units x Price Per Unit
Administered By (Optional): Susan Palacios
Postcare Instructions: Patient instructed to not lie down for 4 hours and limit physical activity for 24 hours. Patient instructed not to travel by airplane for 48 hours.
Map Statment: See attached map for complete details
Detail Level: Simple
Lot #: [id number]

## 2022-08-08 NOTE — PROCEDURE: HYALURONIDASE INJECTION
Consent: The risks of the procedure including pain, burning, contour defects and dimpling of the skin, and the need for multiple treatments were reviewed with the patient prior to the injection.
Detail Level: Detailed
Total Volume (Ccs): 0.1
Hyaluronidase Preparation: Vitrase
Administered By (Optional): Susan Palacios RN
Price (Use Numbers Only, No Special Characters Or $): 0

## 2022-09-23 ENCOUNTER — APPOINTMENT (RX ONLY)
Dept: URBAN - METROPOLITAN AREA CLINIC 12 | Facility: CLINIC | Age: 57
Setting detail: DERMATOLOGY
End: 2022-09-23

## 2022-09-23 DIAGNOSIS — Z41.9 ENCOUNTER FOR PROCEDURE FOR PURPOSES OTHER THAN REMEDYING HEALTH STATE, UNSPECIFIED: ICD-10-CM

## 2022-09-23 PROCEDURE — ? OTHER (COSMETIC)

## 2022-09-23 NOTE — PROCEDURE: OTHER (COSMETIC)
Sticky Other (Free Text): Juvederm Ultra xc \\nLot: [id number]\\nExp: 03/2023\\n0.35cc to upper lip and left upper lip shadow
Detail Level: Simple
Other (Free Text): Injection fee per Susan Palacios RN
Price $ (Use Numbers Only, No Text Please.): 100

## 2022-10-28 ENCOUNTER — APPOINTMENT (RX ONLY)
Dept: URBAN - METROPOLITAN AREA CLINIC 12 | Facility: CLINIC | Age: 57
Setting detail: DERMATOLOGY
End: 2022-10-28

## 2022-10-28 DIAGNOSIS — Z41.9 ENCOUNTER FOR PROCEDURE FOR PURPOSES OTHER THAN REMEDYING HEALTH STATE, UNSPECIFIED: ICD-10-CM

## 2022-10-28 PROCEDURE — ? RESTYLANE-L INJECTION

## 2022-10-28 PROCEDURE — ? BOTOX

## 2022-10-28 NOTE — PROCEDURE: RESTYLANE-L INJECTION
Include Cannula Information In Note?: No
Additional Area 2 Location: lips
Nasolabial Folds Filler Volume In Cc: 0
Map Statement: See Attach Map for Complete Details
Price (Use Numbers Only, No Special Characters Or $): 395
Consent: Written consent obtained. Risks include but not limited to bruising, beading, irregular texture, ulceration, infection, allergic reaction, scar formation, incomplete augmentation, temporary nature, procedural pain.
Lot #: [id number]
Number Of Syringes (Required For Inventory): 1
Additional Area 2 Volume In Cc: 0.3
Topical Anesthesia?: 23% lidocaine, 7% tetracaine
Additional Area 1 Location: chin
Additional Area 3 Location: upper and lower lips
Post-Care Instructions: Patient instructed to apply ice to reduce swelling.
Expiration Date (Month Year): 01/2025
Procedural Text: The filler was administered to the treatment areas noted above.
Filler: Restylane -L
Detail Level: Detailed

## 2022-10-28 NOTE — PROCEDURE: BOTOX
Additional Area 3 Location: medial platysmal bands
Map Statment: See attached map for complete details
Show Price In Note?: yes
Manuel Units: 0
Bill Summary Price Listed Below, Or Bill Total Of Units X Price Per Unit?: Bill #Units x Price Per Unit
Lot #: [id number]
Expiration Date (Month Year): 08/24
Periorbital Skin Units: 20
Price Per Unit In $ (Use Numbers Only, No Text Please.): 15
Detail Level: Simple
Topical Anesthesia?: 23% lidocaine, 7% tetracaine
Postcare Instructions: Patient instructed to not lie down for 4 hours and limit physical activity for 24 hours. Patient instructed not to travel by airplane for 48 hours.
Administered By (Optional): Susan Palacios
Additional Area 2 Location: chin
Dilution (U/0.1 Cc): 5
Forehead Units: 7.5
Glabellar Complex Units: 12.5
Consent: Written consent was obtained prior to the procedure. Risks, benefits, expectations and alternatives were discussed including, but not limited to, infection, bleeding, lid/brow ptosis, bruising, swelling, diplopia, temporary effects, incomplete chemical denervation and dissatisfaction with the cosmetic outcome. No guarantee or warranty was given or implied regarding longevity of results.
Additional Area 1 Location: upper lip

## 2022-12-14 ENCOUNTER — APPOINTMENT (RX ONLY)
Dept: URBAN - METROPOLITAN AREA CLINIC 12 | Facility: CLINIC | Age: 57
Setting detail: DERMATOLOGY
End: 2022-12-14

## 2022-12-14 DIAGNOSIS — Z42.8 ENCOUNTER FOR OTHER PLASTIC AND RECONSTRUCTIVE SURGERY FOLLOWING MEDICAL PROCEDURE OR HEALED INJURY: ICD-10-CM

## 2022-12-14 PROCEDURE — ? BOTOX

## 2022-12-14 NOTE — PROCEDURE: BOTOX
Price Per Unit In $ (Use Numbers Only, No Text Please.): 14
Additional Area 3 Location: lateral platysmal bands
Expiration Date (Month Year): 02/25
Postcare Instructions: Patient instructed to not lie down for 4 hours and limit physical activity for 24 hours. Patient instructed not to travel by airplane for 48 hours.
Perioral Units: 0
Bill Summary Price Listed Below, Or Bill Total Of Units X Price Per Unit?: Bill #Units x Price Per Unit
Map Statment: See attached map for complete details
Administered By (Optional): Susan Palacios
Dilution (U/0.1 Cc): 5
Additional Area 2 Location: chin
Periorbital Skin Units: 17.5
Forehead Units: 10
Show Price In Note?: yes
Additional Area 1 Location: upper lip
Lot #: [id number]
Consent: Written consent was obtained prior to the procedure. Risks, benefits, expectations and alternatives were discussed including, but not limited to, infection, bleeding, lid/brow ptosis, bruising, swelling, diplopia, temporary effects, incomplete chemical denervation and dissatisfaction with the cosmetic outcome. No guarantee or warranty was given or implied regarding longevity of results.
Detail Level: Simple

## 2023-02-17 ENCOUNTER — APPOINTMENT (RX ONLY)
Dept: URBAN - METROPOLITAN AREA CLINIC 12 | Facility: CLINIC | Age: 58
Setting detail: DERMATOLOGY
End: 2023-02-17

## 2023-02-17 DIAGNOSIS — Z41.9 ENCOUNTER FOR PROCEDURE FOR PURPOSES OTHER THAN REMEDYING HEALTH STATE, UNSPECIFIED: ICD-10-CM

## 2023-02-17 PROCEDURE — ? BOTOX

## 2023-02-17 NOTE — PROCEDURE: BOTOX
Additional Area 1 Location: left lateral platysma band
Additional Area 2 Units: 0
Dilution (U/0.1 Cc): 5
Bill Summary Price Listed Below, Or Bill Total Of Units X Price Per Unit?: Bill #Units x Price Per Unit
Show Price In Note?: yes
Reconstitution Date: 02/17/2023
Map Statment: See attached map for complete details
Price Per Unit In $ (Use Numbers Only, No Text Please.): 17
Postcare Instructions: Patient instructed to not lie down for 4 hours and limit physical activity for 24 hours. Patient instructed not to travel by airplane for 48 hours.
Periorbital Skin Units: 20
Additional Area 2 Location: chin
Consent: Written consent was obtained prior to the procedure. Risks, benefits, expectations and alternatives were discussed including, but not limited to, infection, bleeding, lid/brow ptosis, bruising, swelling, diplopia, temporary effects, incomplete chemical denervation and dissatisfaction with the cosmetic outcome. No guarantee or warranty was given or implied regarding longevity of results.
Detail Level: Simple
Additional Area 3 Location: lateral platysmal band
Expiration Date (Month Year): 02/25
Administered By (Optional): Susan Palacios
Lot #: [id number]
Glabellar Complex Units: 15

## 2023-02-21 ENCOUNTER — APPOINTMENT (RX ONLY)
Dept: URBAN - METROPOLITAN AREA CLINIC 12 | Facility: CLINIC | Age: 58
Setting detail: DERMATOLOGY
End: 2023-02-21

## 2023-02-21 DIAGNOSIS — Z41.9 ENCOUNTER FOR PROCEDURE FOR PURPOSES OTHER THAN REMEDYING HEALTH STATE, UNSPECIFIED: ICD-10-CM

## 2023-02-21 PROCEDURE — ? OTHER (COSMETIC)

## 2023-02-21 PROCEDURE — ? JUVEDERM ULTRA XC INJECTION

## 2023-02-21 PROCEDURE — ? BOTOX

## 2023-02-21 PROCEDURE — ? RESTYLANE-L INJECTION

## 2023-02-21 NOTE — PROCEDURE: BOTOX
Periorbital Skin Units: 2.5
Map Statment: See attached map for complete details
Nasal Root Units: 0
Detail Level: Simple
Topical Anesthesia?: 23% lidocaine, 7% tetracaine
Lot #: [id number]
Show Price In Note?: yes
Bill Summary Price Listed Below, Or Bill Total Of Units X Price Per Unit?: Bill #Units x Price Per Unit
Reconstitution Date: 02/21/2023
Expiration Date (Month Year): 2/25
Additional Area 2 Location: chin
Additional Area 3 Location: lateral platysmal band
Dilution (U/0.1 Cc): 5
Additional Area 1 Location: left lateral platysma band
Postcare Instructions: Patient instructed to not lie down for 4 hours and limit physical activity for 24 hours. Patient instructed not to travel by airplane for 48 hours.
Administered By (Optional): Susan Palacios
Consent: Written consent was obtained prior to the procedure. Risks, benefits, expectations and alternatives were discussed including, but not limited to, infection, bleeding, lid/brow ptosis, bruising, swelling, diplopia, temporary effects, incomplete chemical denervation and dissatisfaction with the cosmetic outcome. No guarantee or warranty was given or implied regarding longevity of results.
Price Per Unit In $ (Use Numbers Only, No Text Please.): 14

## 2023-02-21 NOTE — PROCEDURE: OTHER (COSMETIC)
Detail Level: Simple
Sticky Other (Free Text): Restylane-L residual \\nBarcode: [id number]\\nLot: [id number]\\nExp: 01/25\\n0.2 cc to upper lip\\nNo charge per Susan Suzanne \\n\\nRestylane Defyne (with 0.5cc lidocaine)\\nBarcode: [id number]\\nLot: [id number]\\nExp: 03/24\\n0.3 cc to left nasolabial fold, upper perioral shadow, and left blepharoplasty scar \\n$300 charge for 0.3 cc
Price $ (Use Numbers Only, No Text Please.): 300
Other (Free Text): Diluted sharifyne fee per Susan Palacios RN

## 2023-02-21 NOTE — PROCEDURE: RESTYLANE-L INJECTION
Additional Area 4 Location: right nasolabial fold
Mid Face Filler Volume In Cc: 0
Lot #: [id number]
Filler: Restylane -L
Additional Area 3 Location: upper lip
Consent: Written consent obtained. Risks include but not limited to bruising, beading, irregular texture, ulceration, infection, allergic reaction, scar formation, incomplete augmentation, temporary nature, procedural pain.
Detail Level: Detailed
Price (Use Numbers Only, No Special Characters Or $): 395
Map Statement: See Attach Map for Complete Details
Procedural Text: The filler was administered to the treatment areas noted above.
Use Map Statement For Sites (Optional): No
Topical Anesthesia?: 23% lidocaine, 7% tetracaine
Additional Area 1 Location: left facial hallow
Additional Area 5 Location: oral commissures
Expiration Date (Month Year): 01/25
Number Of Syringes (Required For Inventory): 1
Additional Area 2 Location: chin
Additional Area 3 Volume In Cc: 0.5
Post-Care Instructions: Patient instructed to apply ice to reduce swelling.

## 2023-02-21 NOTE — PROCEDURE: JUVEDERM ULTRA XC INJECTION
Additional Area 1 Volume In Cc: 0.1
Cheeks Filler Volume In Cc: 0
Post-Care Instructions: Patient instructed to apply ice to reduce swelling.
Lot #: [id number]
Additional Area 4 Location: lips
Filler: Juvederm Ultra XC
Detail Level: Detailed
Additional Area 2 Location: left lateral face and right jawline
Use Map Statement For Sites (Optional): No
Procedural Text: The filler was administered to the treatment areas noted above.
Additional Area 5 Location: perimental region
Additional Area 3 Location: static forehead wrinkles
Expiration Date (Month Year): 07/2023
Map Statment: See Attach Map for Complete Details
Consent: Written consent obtained. Risks include but not limited to bruising, beading, irregular texture, ulceration, infection, allergic reaction, scar formation, incomplete augmentation, temporary nature, procedural pain.
Additional Area 1 Location: oral commissures
Price (Use Numbers Only, No Special Characters Or $): 877
Topical Anesthesia?: 23% lidocaine, 7% tetracaine
Additional Area 5 Volume In Cc: 0.5
Number Of Syringes (Required For Inventory): 1

## 2023-04-19 ENCOUNTER — APPOINTMENT (RX ONLY)
Dept: URBAN - METROPOLITAN AREA CLINIC 12 | Facility: CLINIC | Age: 58
Setting detail: DERMATOLOGY
End: 2023-04-19

## 2023-04-19 DIAGNOSIS — Z41.9 ENCOUNTER FOR PROCEDURE FOR PURPOSES OTHER THAN REMEDYING HEALTH STATE, UNSPECIFIED: ICD-10-CM

## 2023-04-19 PROCEDURE — ? JUVEDERM ULTRA XC INJECTION

## 2023-04-19 PROCEDURE — ? BOTOX

## 2023-04-19 NOTE — PROCEDURE: JUVEDERM ULTRA XC INJECTION
Expiration Date (Month Year): 02/2024
Mid Face Filler Volume In Cc: 0
Price (Use Numbers Only, No Special Characters Or $): 395
Post-Care Instructions: Patient instructed to apply ice to reduce swelling.
Include Cannula Information In Note?: No
Additional Area 2 Location: left lateral face and right jawline
Additional Area 4 Location: oral commissures
Procedural Text: The filler was administered to the treatment areas noted above.
Number Of Syringes (Required For Inventory): 1
Consent: Written consent obtained. Risks include but not limited to bruising, beading, irregular texture, ulceration, infection, allergic reaction, scar formation, incomplete augmentation, temporary nature, procedural pain.
Lot #: [id number]
Additional Area 1 Volume In Cc: 0.6
Detail Level: Detailed
Additional Area 1 Location: lips
Additional Area 3 Location: static forehead wrinkles
Map Statment: See Attach Map for Complete Details
Additional Area 5 Location: perimental region
Filler: Juvederm Ultra XC

## 2023-04-19 NOTE — PROCEDURE: BOTOX
Lot #: [id number]
Use Map Statement For Sites (Optional): Yes
Perioral Units: 0
Topical Anesthesia?: 23% lidocaine, 7% tetracaine
Additional Area 3 Location: medial platysmal band
Glabellar Complex Units: 12.5
Forehead Units: 7.5
Additional Area 1 Location: necklace lines
Dilution (U/0.1 Cc): 5
Expiration Date (Month Year): 04/2025
Additional Area 2 Location: chin
Consent: Written consent was obtained prior to the procedure. Risks, benefits, expectations and alternatives were discussed including, but not limited to, infection, bleeding, lid/brow ptosis, bruising, swelling, diplopia, temporary effects, incomplete chemical denervation and dissatisfaction with the cosmetic outcome. No guarantee or warranty was given or implied regarding longevity of results.
Price Per Unit In $ (Use Numbers Only, No Text Please.): 15
Periorbital Skin Units: 30
Bill Summary Price Listed Below, Or Bill Total Of Units X Price Per Unit?: Bill #Units x Price Per Unit
Administered By (Optional): Susan Palacios
Reconstitution Date: 04/19/2023
Map Statment: See attached map for complete details
Postcare Instructions: Patient instructed to not lie down for 4 hours and limit physical activity for 24 hours. Patient instructed not to travel by airplane for 48 hours.
Detail Level: Simple

## 2023-06-09 NOTE — PROCEDURE: FILLERS
Dorsal Hands Filler Volume In Cc: 0
Show Price In Note?: no
Consent: Written consent obtained. Risks include but not limited to bruising, beading, irregular texture, ulceration, infection, allergic reaction, scar formation, incomplete augmentation, temporary nature, procedural pain.
Administered By (Optional): Susan Palacios
Topical Anesthesia?: yes
Detail Level: Detailed
Filler: Juvederm Volbella XC
Additional Area 3 Location: perioral lines
Additional Area 4 Location: periorbital
Additional Area 1 Location: glabellar/nasal root lines
Lot #: [id number]
Additional Area 2 Location: forehead depressions
Map Statment: See attached map for complete details
no

## 2024-07-01 NOTE — PROCEDURE: POST-OP CHECK
Add Postop Global No-Charge Code (63078)?: yes
Wound Evaluated By: Dr. Mojica
Detail Level: Detailed
-advised likely bacterial or allergy, pt does not think allergy as she has d/c all her products \\n-pt has lactose allergy/sensitivity as of May, advised to stay away from citrus and spicy foods as well\\n-pt has tried Dr Hinkle’s cortibalm and a natural lip balm
Detail Level: Zone

## 2024-10-28 ENCOUNTER — TELEPHONE ENCOUNTER (OUTPATIENT)
Dept: URBAN - METROPOLITAN AREA CLINIC 96 | Facility: CLINIC | Age: 59
End: 2024-10-28

## 2024-10-28 RX ORDER — PANTOPRAZOLE SODIUM 40 MG/1
1 TABLET TABLET, DELAYED RELEASE ORAL ONCE A DAY
Qty: 30 TABLET | Refills: 11 | OUTPATIENT
Start: 2024-10-28

## 2024-11-05 ENCOUNTER — OFFICE VISIT (OUTPATIENT)
Dept: URBAN - METROPOLITAN AREA TELEHEALTH 2 | Facility: TELEHEALTH | Age: 59
End: 2024-11-05
Payer: COMMERCIAL

## 2024-11-05 ENCOUNTER — LAB OUTSIDE AN ENCOUNTER (OUTPATIENT)
Dept: URBAN - METROPOLITAN AREA TELEHEALTH 2 | Facility: TELEHEALTH | Age: 59
End: 2024-11-05

## 2024-11-05 DIAGNOSIS — K63.5 COLON POLYP: ICD-10-CM

## 2024-11-05 DIAGNOSIS — R13.10 DYSPHAGIA: ICD-10-CM

## 2024-11-05 DIAGNOSIS — Z91.89 COLON CANCER HIGH RISK: ICD-10-CM

## 2024-11-05 PROCEDURE — 99213 OFFICE O/P EST LOW 20 MIN: CPT | Performed by: INTERNAL MEDICINE

## 2024-11-05 RX ORDER — PANTOPRAZOLE SODIUM 40 MG/1
1 TABLET TABLET, DELAYED RELEASE ORAL ONCE A DAY
Qty: 30 TABLET | Refills: 11 | Status: ACTIVE | COMMUNITY
Start: 2024-10-28

## 2024-11-05 RX ORDER — FAMOTIDINE 40 MG/1
1 TAB TABLET, FILM COATED ORAL EVERY 12 HOURS
Qty: 60 TABLET | Refills: 11 | OUTPATIENT

## 2024-11-05 RX ORDER — PANTOPRAZOLE SODIUM 40 MG/1
1 TABLET TABLET, DELAYED RELEASE ORAL ONCE A DAY
Qty: 30 TABLET | Refills: 11 | OUTPATIENT
Start: 2024-11-05

## 2024-11-05 RX ORDER — FAMOTIDINE 40 MG/1
1 TAB TABLET, FILM COATED ORAL BID
Qty: 60 TABLET | Refills: 11 | Status: ACTIVE | COMMUNITY
Start: 2021-07-28

## 2024-11-05 NOTE — HPI-TODAY'S VISIT:
Prev on 7/28/2021, pt reports that, pt has h/o EOE on occasion if she eats certain meats or swallow pills incorrectly.  She has dysphagia, andrae to pills and certain foods (like solids).  She has occasional abdominal pain, in RLQ area.  Has regular BM, no blood in the stool or diarrhea. No weight loss.  No NSAIDs, occasionally Maxalt. . Today on 11/5/2024, she reports that stress exacerbates her swallowing.  She had an episode of esophageal closure in her lower neck; unable to swallow water or anything.  Took a benadryl liquid.  Occasional dysphagia here and there with pills.  Also with meats.  Episodes occur about weekly (for entire life). Pepcid. . PMH: h/o EOE . EGD: 2021: duodenum normal; stomach nl HP neg; eosinoplhilia in esophagus with esophagitis. Colonoscopy: 2021: normal.

## 2025-06-01 NOTE — PROCEDURE: OTHER (COSMETIC)
Abdominal Pain, N/V/D
Other (Free Text): No charge per Susan Palacios RN
Price $ (Use Numbers Only, No Text Please.): 0
Aditi Other (Free Text): Sample Giorgio \\nLot:[id number]\\nExp: 12/2022\\n0.1 cc used on the left upper lip
Detail Level: Simple